# Patient Record
Sex: FEMALE | Race: ASIAN | Employment: OTHER | URBAN - METROPOLITAN AREA
[De-identification: names, ages, dates, MRNs, and addresses within clinical notes are randomized per-mention and may not be internally consistent; named-entity substitution may affect disease eponyms.]

---

## 2017-01-30 DIAGNOSIS — R73.01 IMPAIRED FASTING GLUCOSE: ICD-10-CM

## 2017-01-30 DIAGNOSIS — M81.0 OSTEOPOROSIS: Primary | ICD-10-CM

## 2017-02-01 RX ORDER — CALCITONIN SALMON 200 [IU]/.09ML
SPRAY, METERED NASAL
Qty: 3.7 ML | Refills: 1 | Status: SHIPPED | OUTPATIENT
Start: 2017-02-01 | End: 2017-04-10

## 2017-03-01 ENCOUNTER — TELEPHONE (OUTPATIENT)
Dept: FAMILY MEDICINE | Facility: OTHER | Age: 67
End: 2017-03-01

## 2017-03-01 DIAGNOSIS — E03.9 HYPOTHYROIDISM: ICD-10-CM

## 2017-03-01 DIAGNOSIS — E11.9 TYPE 2 DIABETES MELLITUS WITHOUT COMPLICATION (H): ICD-10-CM

## 2017-03-02 RX ORDER — LEVOTHYROXINE SODIUM 75 UG/1
TABLET ORAL
Qty: 30 TABLET | Refills: 0 | Status: SHIPPED | OUTPATIENT
Start: 2017-03-02 | End: 2017-04-10

## 2017-03-02 RX ORDER — ASPIRIN 81 MG
TABLET, DELAYED RELEASE (ENTERIC COATED) ORAL
Qty: 120 TABLET | Refills: 1 | OUTPATIENT
Start: 2017-03-02

## 2017-03-02 NOTE — TELEPHONE ENCOUNTER
Metformin 500 mg         Last Written Prescription Date: 12/2/2016  Last Fill Quantity: 180, # refills: 0  Last Office Visit with [facility] [place], [facility] or  Health prescribing provider:  09/13/2016        BP Readings from Last 3 Encounters:   09/13/16 120/80   07/26/16 124/66   06/22/16 109/87     Lab Results   Component Value Date    MICROL <5 09/13/2016     No results found for: MICROALBUMIN  Creatinine   Date Value Ref Range Status   08/23/2016 0.66 0.52 - 1.04 mg/dL Final   ]  GFR Estimate   Date Value Ref Range Status   08/23/2016 >90  Non  GFR Calc   >60 mL/min/1.7m2 Final   02/09/2016 88 >60 mL/min/1.7m2 Final     Comment:     Non  GFR Calc   09/09/2014 88 >60 mL/min/1.7m2 Final     Comment:     Non  GFR Calc     GFR Estimate If Black   Date Value Ref Range Status   08/23/2016 >90   GFR Calc   >60 mL/min/1.7m2 Final   02/09/2016 >90   GFR Calc   >60 mL/min/1.7m2 Final   09/09/2014 >90   GFR Calc   >60 mL/min/1.7m2 Final     Lab Results   Component Value Date    CHOL 152 02/09/2016     Lab Results   Component Value Date    HDL 61 02/09/2016     Lab Results   Component Value Date    LDL 76 02/09/2016     Lab Results   Component Value Date    TRIG 73 02/09/2016     Lab Results   Component Value Date    CHOLHDLRATIO 2.3 09/09/2014     Lab Results   Component Value Date    AST 21 08/23/2016     Lab Results   Component Value Date    ALT 31 08/23/2016     Lab Results   Component Value Date    A1C 6.2 08/23/2016    A1C 6.4 02/09/2016    A1C 6.4 05/07/2015    A1C 6.3 09/09/2014    A1C 6.0 09/25/2013     Potassium   Date Value Ref Range Status   08/23/2016 4.2 3.4 - 5.3 mmol/L Final       Levothyroxine 75 mcg     Last Written Prescription Date: 03/02/2016  Last Quantity: 90, # refills: 3  Last Office Visit with [facility] [place], [facility] or  Health prescribing provider: 09/13/2016        TSH   Date Value Ref Range Status   02/09/2016 3.79 0.40 - 4.00  mU/L Final

## 2017-03-02 NOTE — TELEPHONE ENCOUNTER
Metformin  Medication is being filled for 1 time refill only due to:  Patient needs labs A1c and Fasting lipids. Patient needs to be seen because due for diabetes follow up.  Please contact patient to schedule follow up with labs.   Nelida Marvin RN    Medication is being filled for 1 time refill only due to:  Patient needs labs TSH.  .   Nelida Marvin RN

## 2017-03-02 NOTE — TELEPHONE ENCOUNTER
Aspirin 81 mg      Last Written Prescription Date: 11/1/2016  Last Fill Quantity: 120,  # refills: 1   Last Office Visit with [facility], [facility] or [facility] prescribing provider: 09/13/2016

## 2017-03-03 NOTE — TELEPHONE ENCOUNTER
Called pt, was able to have her tell me that she speaks Mandarin Chinese. Called pt back with , pt did not answer. Will try again.

## 2017-03-03 NOTE — TELEPHONE ENCOUNTER
Refill not appropriate. Last sent 11/01/2016 #120, 1 refill. Refills remaining  Nelida Marvin RN

## 2017-03-07 ENCOUNTER — TELEPHONE (OUTPATIENT)
Dept: FAMILY MEDICINE | Facility: OTHER | Age: 67
End: 2017-03-07

## 2017-03-08 ENCOUNTER — TELEPHONE (OUTPATIENT)
Dept: PHARMACY | Facility: OTHER | Age: 67
End: 2017-03-08

## 2017-03-08 NOTE — TELEPHONE ENCOUNTER
MTM referral from: [facility]     MTM referral outreach attempt #1 on March 8, 2017 at 1:03 PM      Outcome: Patient is not interested at this time because she is in [facility] and will talk to Diamond when she get back in over a month (called with kingp), will route to MTM Pharmacist/Provider as an FYI. Thank you for the referral.     Delilah Vale, MTM Coordinator

## 2017-03-22 DIAGNOSIS — E11.9 TYPE 2 DIABETES MELLITUS WITHOUT COMPLICATION (H): ICD-10-CM

## 2017-03-22 DIAGNOSIS — E03.9 HYPOTHYROIDISM: ICD-10-CM

## 2017-03-22 LAB
CHOLEST SERPL-MCNC: 160 MG/DL
HBA1C MFR BLD: 6.3 % (ref 4.3–6)
HDLC SERPL-MCNC: 62 MG/DL
LDLC SERPL CALC-MCNC: 82 MG/DL
NONHDLC SERPL-MCNC: 98 MG/DL
TRIGL SERPL-MCNC: 82 MG/DL
TSH SERPL DL<=0.005 MIU/L-ACNC: 1.87 MU/L (ref 0.4–4)

## 2017-03-22 PROCEDURE — 80061 LIPID PANEL: CPT | Performed by: FAMILY MEDICINE

## 2017-03-22 PROCEDURE — 36415 COLL VENOUS BLD VENIPUNCTURE: CPT | Performed by: FAMILY MEDICINE

## 2017-03-22 PROCEDURE — 83036 HEMOGLOBIN GLYCOSYLATED A1C: CPT | Performed by: FAMILY MEDICINE

## 2017-03-22 PROCEDURE — 84443 ASSAY THYROID STIM HORMONE: CPT | Performed by: FAMILY MEDICINE

## 2017-04-06 NOTE — PROGRESS NOTES
SUBJECTIVE:                                                    Vero Scott is a 66 year old female who presents to clinic today for the following health issues:      HPI    Diabetes Follow-up    Patient is checking blood sugars: once daily.  Results are as follows:         am - 100s    Diabetic concerns: None and other - Blood Sugars after lunch are usually 140s     Symptoms of hypoglycemia (low blood sugar): none     Paresthesias (numbness or burning in feet) or sores: Yes Weakness on right leg     Date of last diabetic eye exam: July 2016     Hyperlipidemia Follow-Up      Rate your low fat/cholesterol diet?: not monitoring fat    Taking statin?  Yes (Lovastatin), no muscle aches from statin    Other lipid medications/supplements?:  none     Hypothyroidism Follow-up      Since last visit, patient describes the following symptoms: Weight stable, no hair loss, no skin changes, no constipation, no loose stools     Patient has a concern about her right eye.    Problem list and histories reviewed & adjusted, as indicated.  Additional history: as documented      Patient Active Problem List   Diagnosis     Esophageal reflux     Postmenopausal atrophic vaginitis     Nonspecific reaction to tuberculin skin test without active tuberculosis     Anxiety state     Other osteoporosis     Hypothyroidism     Adenomatous polyp of colon     Hyperlipidemia LDL goal <130     Advanced directives, counseling/discussion     Type 2 diabetes mellitus without complication (H)     Obstructive sleep apnea     Patellofemoral disorder of right knee     Rotator cuff syndrome     Pulmonary nodule     Obesity, Class II, BMI 35-39.9 (H)     Female stress incontinence     Plantar fasciitis     Bloating     Past Surgical History:   Procedure Laterality Date     C MAMMOGRAM, SCREENING  2003, 2002     C TOTAL ABDOM HYSTERECTOMY  2002    with BSO, for cervical dysplasia, no malignancy on biopsy     COLONOSCOPY  11/10/2010    COMBINED COLONOSCOPY,  REMOVE TUMOR/POLYP/LESION BY SNARE performed by JOHNATHON BHATT at  GI, recheck in 3 years     COLONOSCOPY N/A 4/10/2015    Procedure: COMBINED COLONOSCOPY, SINGLE OR MULTIPLE BIOPSY/POLYPECTOMY BY BIOPSY;  Surgeon: Johnathon Bhatt MD;  Location:  GI     COMBINED ESOPHAGOSCOPY, GASTROSCOPY, DUODENOSCOPY (EGD) WITH CO2 INSUFFLATION Bilateral 6/22/2016    Procedure: COMBINED ESOPHAGOSCOPY, GASTROSCOPY, DUODENOSCOPY (EGD) WITH CO2 INSUFFLATION;  Surgeon: Duane, William Charles, MD;  Location: [facility] OR     CONIZATION CERVIX,KNIFE/LASER  2002    CIS     ESOPHAGOSCOPY, GASTROSCOPY, DUODENOSCOPY (EGD), COMBINED N/A 6/22/2016    Procedure: COMBINED ESOPHAGOSCOPY, GASTROSCOPY, DUODENOSCOPY (EGD), BIOPSY SINGLE OR MULTIPLE;  Surgeon: Duane, William Charles, MD;  Location: [facility] OR     HC COLONOSCOPY THRU STOMA W BIOPSY/CAUTERY TUMOR/POLYP/LESION  1/10/2006    adenomatous, recheck in 5 years     HC UGI ENDOSCOPY DIAG W BIOPSY  11/22/05     HC UGI ENDOSCOPY, SIMPLE EXAM  12/03    abnormal     HCL PAP SMEAR  2002     HYSTERECTOMY, PAP NO LONGER INDICATED       SLING TRANSVAGINAL N/A 3/31/2016    Procedure: SLING TRANSVAGINAL;  Surgeon: González Wright MD;  Location: [facility] OR       Social History   Substance Use Topics     Smoking status: Never Smoker     Smokeless tobacco: Never Used      Comment: no smokers in household     Alcohol use Yes      Comment: occ     Family History   Problem Relation Age of Onset     CANCER No family hx of      breast or colon     DIABETES No family hx of      Hypertension No family hx of      HEART DISEASE No family hx of      CAD     Breast Cancer No family hx of      Depression No family hx of      Substance Abuse No family hx of      Anesthesia Reaction No family hx of      Genetic Disorder No family hx of          Current Outpatient Prescriptions   Medication Sig Dispense Refill     levothyroxine (SYNTHROID/LEVOTHROID) 75 MCG tablet Take 1 tablet (75 mcg) by mouth every morning 90 tablet 3      metFORMIN (GLUCOPHAGE) 500 MG tablet Take 1 tablet (500 mg) by mouth 2 times daily (with meals) 180 tablet 1     lovastatin (MEVACOR) 20 MG tablet Take 1 tablet (20 mg) by mouth At Bedtime 90 tablet 3     calcitonin, salmon, (MIACALCIN) 200 UNIT/ACT nasal spray SPRAY ONE SPRAY INTO ONE NOSTRIL DAILY.  (ALTERNATE NOSTRIL EACH DAY) 3.7 mL 1     blood glucose monitoring (ANGELA MICROLET) lancets TEST DAILY 1 Box 5     ANGELA CONTOUR test strip TEST DAILY AS DIRECTED 50 each 1     ANGELA CONTOUR test strip TEST DAILY AS DIRECTED 50 each 2     ASPIRIN LOW DOSE 81 MG EC tablet TAKE ONE TABLET BY MOUTH EVERY  tablet 1     Blood Pressure KIT Check blood pressures weekly 1 kit 0     blood glucose monitoring (NO BRAND SPECIFIED) meter device kit Use to test blood sugar one  times daily or as directed. 1 kit 0     blood glucose monitoring (NO BRAND SPECIFIED) test strip Use to test blood sugars one  times daily or as directed 90 each 3     blood glucose (NO BRAND SPECIFIED) lancets standard Use to test blood sugar one  times daily or as directed. 1 Box 0     omeprazole (PRILOSEC) 20 MG capsule Take 1 tablet daily for 2 weeks and if still having heartburn symptoms can increase to 1 tablet twice daily. 60 capsule 1     Cholecalciferol (VITAMIN D3 PO) Take 600 Units by mouth daily       blood glucose (ANGELA MICROLET 2) lancing device Use to test blood sugars 1 times daily or as directed. 1 each 0     [DISCONTINUED] metFORMIN (GLUCOPHAGE) 500 MG tablet TAKE ONE TABLET BY MOUTH TWICE A DAY WITH MEALS 60 tablet 0     [DISCONTINUED] levothyroxine (SYNTHROID/LEVOTHROID) 75 MCG tablet TAKE ONE TABLET BY MOUTH EVERY MORNING 30 tablet 0     [DISCONTINUED] lovastatin (MEVACOR) 20 MG tablet TAKE ONE TABLET BY MOUTH AT BEDTIME 90 tablet 0     ranitidine (ZANTAC) 150 MG tablet TAKE ONE TABLET BY MOUTH TWICE A  tablet 3     ORDER FOR DME Equipment being ordered: super feet size c       Allergies   Allergen Reactions     Sulfa Drugs  "     n&v     Recent Labs   Lab Test  03/22/17   1100  08/23/16   0943  02/09/16   1324   09/09/14   1128   01/03/13   1024   A1C  6.3*  6.2*  6.4*   < >  6.3*   < >  6.4*   LDL  82   --   76   --   77   < >  101   HDL  62   --   61   --   71   < >  59   TRIG  82   --   73   --   84   < >  67   ALT   --   31   --    --   27   --   26   CR   --   0.66  0.67   --   0.68   --    --    GFRESTIMATED   --   >90  Non  GFR Calc    88   --   88   --    --    GFRESTBLACK   --   >90   GFR Calc    >90   GFR Calc     --   >90   GFR Calc     --    --    POTASSIUM   --   4.2   --    --   3.8   --    --    TSH  1.87   --   3.79   < >  1.11   < >  0.98    < > = values in this interval not displayed.      BP Readings from Last 3 Encounters:   04/10/17 122/78   09/13/16 120/80   07/26/16 124/66    Wt Readings from Last 3 Encounters:   04/10/17 185 lb (83.9 kg)   09/13/16 185 lb (83.9 kg)   07/26/16 180 lb (81.6 kg)                  Labs reviewed in EPIC    ROS:  Constitutional, HEENT, cardiovascular, pulmonary, gi and gu systems are negative, except as otherwise noted.    OBJECTIVE:                                                    /78 (BP Location: Right arm, Patient Position: Chair, Cuff Size: Adult Large)  Pulse 70  Temp 97.5  F (36.4  C) (Oral)  Resp 16  Ht 5' 1.9\" (1.572 m)  Wt 185 lb (83.9 kg)  SpO2 96%  BMI 33.95 kg/m2  Body mass index is 33.95 kg/(m^2).  Physical Exam   Constitutional: She is oriented to person, place, and time. She appears well-developed and well-nourished.   HENT:   Head: Normocephalic and atraumatic.   Eyes: EOM are normal. Pupils are equal, round, and reactive to light.   Cardiovascular: Normal rate and regular rhythm.    Pulmonary/Chest: Effort normal and breath sounds normal.   Musculoskeletal: Normal range of motion.   Neurological: She is alert and oriented to person, place, and time.   Psychiatric: She has a normal mood and " "affect.         Diagnostic Test Results:  none      ASSESSMENT/PLAN:                                                      Problem List Items Addressed This Visit     Hypothyroidism     TSH well controlled  Continue current dose  Refill meds         Relevant Medications    levothyroxine (SYNTHROID/LEVOTHROID) 75 MCG tablet    Hyperlipidemia LDL goal <130     Continue statin         Relevant Medications    lovastatin (MEVACOR) 20 MG tablet    Type 2 diabetes mellitus without complication (H)     -Last a1c-6.3  -Home fasting Blood sugars- well controlled  -Last eye exam-July 2016  -Diabetic foot exam -Mild neuropathy.Callus formation Discussed foot care  -Last urine microalbumin- neg . 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)    -  Primary    Relevant Medications    levothyroxine (SYNTHROID/LEVOTHROID) 75 MCG tablet    metFORMIN (GLUCOPHAGE) 500 MG tablet    calcitonin, salmon, (MIACALCIN) 200 UNIT/ACT nasal spray    Other Relevant Orders    ORTHOTICS REFERRAL    Screening for diabetic retinopathy        Screening for diabetic peripheral neuropathy        Relevant Orders    FOOT EXAM  NO CHARGE [27522.114] (Completed)    Osteoporosis        Relevant Medications    calcitonin, salmon, (MIACALCIN) 200 UNIT/ACT nasal spray             BMI:   Estimated body mass index is 33.95 kg/(m^2) as calculated from the following:    Height as of this encounter: 5' 1.9\" (1.572 m).    Weight as of this encounter: 185 lb (83.9 kg).   Weight " management plan: Discussed healthy diet and exercise guidelines and patient will follow up in 6 months in clinic to re-evaluate.      See Patient Instructions    Marie Warner MD  [facility]

## 2017-04-10 ENCOUNTER — OFFICE VISIT (OUTPATIENT)
Dept: FAMILY MEDICINE | Facility: OTHER | Age: 67
End: 2017-04-10
Payer: COMMERCIAL

## 2017-04-10 VITALS
WEIGHT: 185 LBS | BODY MASS INDEX: 34.04 KG/M2 | DIASTOLIC BLOOD PRESSURE: 78 MMHG | OXYGEN SATURATION: 96 % | HEIGHT: 62 IN | RESPIRATION RATE: 16 BRPM | TEMPERATURE: 97.5 F | HEART RATE: 70 BPM | SYSTOLIC BLOOD PRESSURE: 122 MMHG

## 2017-04-10 DIAGNOSIS — E11.9 TYPE 2 DIABETES MELLITUS WITHOUT COMPLICATION, WITHOUT LONG-TERM CURRENT USE OF INSULIN (H): ICD-10-CM

## 2017-04-10 DIAGNOSIS — M81.0 OSTEOPOROSIS: ICD-10-CM

## 2017-04-10 DIAGNOSIS — Z13.5 SCREENING FOR DIABETIC RETINOPATHY: ICD-10-CM

## 2017-04-10 DIAGNOSIS — E11.49 OTHER DIABETIC NEUROLOGICAL COMPLICATION ASSOCIATED WITH TYPE 2 DIABETES MELLITUS (H): Primary | ICD-10-CM

## 2017-04-10 DIAGNOSIS — E78.5 HYPERLIPIDEMIA LDL GOAL <130: ICD-10-CM

## 2017-04-10 DIAGNOSIS — E03.9 HYPOTHYROIDISM, UNSPECIFIED TYPE: ICD-10-CM

## 2017-04-10 DIAGNOSIS — Z13.89 SCREENING FOR DIABETIC PERIPHERAL NEUROPATHY: ICD-10-CM

## 2017-04-10 PROCEDURE — 99214 OFFICE O/P EST MOD 30 MIN: CPT | Performed by: FAMILY MEDICINE

## 2017-04-10 PROCEDURE — 99207 C FOOT EXAM  NO CHARGE: CPT | Performed by: FAMILY MEDICINE

## 2017-04-10 RX ORDER — LOVASTATIN 20 MG
20 TABLET ORAL AT BEDTIME
Qty: 90 TABLET | Refills: 3 | Status: SHIPPED | OUTPATIENT
Start: 2017-04-10 | End: 2018-04-08

## 2017-04-10 RX ORDER — CALCITONIN SALMON 200 [IU]/.09ML
SPRAY, METERED NASAL
Qty: 3.7 ML | Refills: 1 | Status: SHIPPED | OUTPATIENT
Start: 2017-04-10 | End: 2017-06-01

## 2017-04-10 RX ORDER — LEVOTHYROXINE SODIUM 75 UG/1
75 TABLET ORAL EVERY MORNING
Qty: 90 TABLET | Refills: 3 | Status: SHIPPED | OUTPATIENT
Start: 2017-04-10 | End: 2018-04-08

## 2017-04-10 ASSESSMENT — PAIN SCALES - GENERAL: PAINLEVEL: NO PAIN (0)

## 2017-04-10 NOTE — MR AVS SNAPSHOT
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)    -  1    Screening for diabetic retinopathy        Screening for diabetic peripheral neuropathy        Hypothyroidism, unspecified type        Type 2 diabetes mellitus without complication, without long-term current use of insulin (H)        Hyperlipidemia LDL goal <130        Osteoporosis           Follow-ups after your visit        Additional Services     ORTHOTICS REFERRAL       **This referral order prints off in the [facility]  (Orthotics & Prosthetics) Central Scheduling Office**    The [facility] Central Scheduling Staff will contact the patient to schedule appointments.     Central Scheduling Contact Information: [phone] ([place])    Orthotics: Foot Orthotics    Please be aware that coverage of these services is subject to the terms and limitations of your health insurance plan.  Call member services at your health plan with any benefit or coverage questions.      Please bring the following to your appointment:    >>   Any x-rays, CTs or MRIs which have been performed.  Contact the facility where they were done to arrange for  prior to your scheduled appointment.    >>   List of current medications   >>   This referral request   >>   Any documents/labs given to you for this referral                  Follow-up notes from your care team     Return in about 6 months (around 10/10/2017).      Future tests that were ordered for you today     Open Future Orders        Priority Expected Expires Ordered    Comprehensive metabolic panel (BMP + Alb, Alk Phos, ALT, AST,  "Total. Bili, TP) Routine  4/10/2018 4/10/2017    Hemoglobin A1c Routine  4/10/2018 4/10/2017            Who to contact     If you have questions or need follow up information about today's clinic visit or your schedule please contact [facility] [place] directly at [phone].  Normal or non-critical lab and imaging results will be communicated to you by MyChart, letter or phone within 4 business days after the clinic has received the results. If you do not hear from us within 7 days, please contact the clinic through [facility] or phone. If you have a critical or abnormal lab result, we will notify you by phone as soon as possible.  Submit refill requests through [facility] or call your pharmacy and they will forward the refill request to us. Please allow 3 business days for your refill to be completed.          Additional Information About Your Visit        MyChart Information     [facility] gives you secure access to your electronic health record. If you see a primary care provider, you can also send messages to your care team and make appointments. If you have questions, please call your primary care clinic.  If you do not have a primary care provider, please call [phone] and they will assist you.        Care EveryWhere ID     This is your Care EveryWhere ID. This could be used by other organizations to access your [place] medical records  [id number]        Your Vitals Were     Pulse Temperature Respirations Height Pulse Oximetry BMI (Body Mass Index)    70 97.5  F (36.4  C) (Oral) 16 5' 1.9\" (1.572 m) 96% 33.95 kg/m2       Blood Pressure from Last 3 Encounters:   04/10/17 122/78   09/13/16 120/80   07/26/16 124/66    Weight from Last 3 Encounters:   04/10/17 185 lb (83.9 kg)   09/13/16 185 lb (83.9 kg)   07/26/16 180 lb (81.6 kg)              We Performed the Following     FOOT EXAM  NO CHARGE [00575.114]     ORTHOTICS REFERRAL          Today's Medication Changes          These changes are accurate as " of: 4/10/17 12:43 PM.  If you have any questions, ask your nurse or doctor.               These medicines have changed or have updated prescriptions.        Dose/Directions    calcitonin (salmon) 200 UNIT/ACT nasal spray   Commonly known as:  MIACALCIN   This may have changed:  See the new instructions.   Used for:  Osteoporosis   Changed by:  Marie Warner MD        SPRAY ONE SPRAY INTO ONE NOSTRIL DAILY.  (ALTERNATE NOSTRIL EACH DAY)   Quantity:  3.7 mL   Refills:  1       levothyroxine 75 MCG tablet   Commonly known as:  SYNTHROID/LEVOTHROID   This may have changed:  See the new instructions.   Used for:  Hypothyroidism, unspecified type   Changed by:  Marie Warner MD        Dose:  75 mcg   Take 1 tablet (75 mcg) by mouth every morning   Quantity:  90 tablet   Refills:  3       lovastatin 20 MG tablet   Commonly known as:  MEVACOR   This may have changed:  See the new instructions.   Used for:  Hyperlipidemia LDL goal <130   Changed by:  Marie Warner MD        Dose:  20 mg   Take 1 tablet (20 mg) by mouth At Bedtime   Quantity:  90 tablet   Refills:  3       metFORMIN 500 MG tablet   Commonly known as:  GLUCOPHAGE   This may have changed:  See the new instructions.   Used for:  Type 2 diabetes mellitus without complication, without long-term current use of insulin (H)   Changed by:  Marie Warner MD        Dose:  500 mg   Take 1 tablet (500 mg) by mouth 2 times daily (with meals)   Quantity:  180 tablet   Refills:  1         Stop taking these medicines if you haven't already. Please contact your care team if you have questions.     clotrimazole-betamethasone cream   Commonly known as:  LOTRISONE   Stopped by:  Marie Warner MD           HYDROcodone-acetaminophen 5-325 MG per tablet   Commonly known as:  NORCO   Stopped by:  Marie Warner MD           solifenacin 5 MG tablet   Commonly known as:  VESICARE   Stopped by:  Marie Warner MD           terbinafine 1 % cream   Commonly known  as:  lamISIL AT   Stopped by:  Marie Warner MD           tolterodine 2 MG tablet   Commonly known as:  DETROL   Stopped by:  Marie Warner MD                Where to get your medicines      These medications were sent to [facility] [place], [place] - [address] [facility]  [address] [facility] [address]     Phone:  [phone]     calcitonin (salmon) 200 UNIT/ACT nasal spray    levothyroxine 75 MCG tablet    lovastatin 20 MG tablet    metFORMIN 500 MG tablet                Primary Care Provider Office Phone # Fax #    Marie Warner [phone]       [facility] [address] [facility] [address]    [facility] [address]        Thank you!     Thank you for choosing [facility]  for your care. Our goal is always to provide you with excellent care. Hearing back from our patients is one way we can continue to improve our services. Please take a few minutes to complete the written survey that you may receive in the mail after your visit with us. Thank you!             Your Updated Medication List - Protect others around you: Learn how to safely use, store and throw away your medicines at www.disposemymeds.org.          This list is accurate as of: 4/10/17 12:43 PM.  Always use your most recent med list.                   Brand Name Dispense Instructions for use    ASPIRIN LOW DOSE 81 MG EC tablet   Generic drug:  aspirin     120 tablet    TAKE ONE TABLET BY MOUTH EVERY DAY       blood glucose lancets standard    no brand specified    1 Box    Use to test blood sugar one  times daily or as directed.       blood glucose lancing device     1 each    Use to test blood sugars 1 times daily or as directed.       blood glucose monitoring lancets     1 Box    TEST DAILY       blood glucose monitoring meter device kit    no brand specified    1 kit    Use to test blood sugar one  times daily or as directed.       * blood glucose monitoring test strip    no brand specified     90 each    Use to test blood sugars one  times daily or as directed       * ANGELA CONTOUR test strip   Generic drug:  blood glucose monitoring     50 each    TEST DAILY AS DIRECTED       * ANGELA CONTOUR test strip   Generic drug:  blood glucose monitoring     50 each    TEST DAILY AS DIRECTED       Blood Pressure Kit     1 kit    Check blood pressures weekly       calcitonin (salmon) 200 UNIT/ACT nasal spray    MIACALCIN    3.7 mL    SPRAY ONE SPRAY INTO ONE NOSTRIL DAILY.  (ALTERNATE NOSTRIL EACH DAY)       levothyroxine 75 MCG tablet    SYNTHROID/LEVOTHROID    90 tablet    Take 1 tablet (75 mcg) by mouth every morning       lovastatin 20 MG tablet    MEVACOR    90 tablet    Take 1 tablet (20 mg) by mouth At Bedtime       metFORMIN 500 MG tablet    GLUCOPHAGE    180 tablet    Take 1 tablet (500 mg) by mouth 2 times daily (with meals)       omeprazole 20 MG CR capsule    priLOSEC    60 capsule    Take 1 tablet daily for 2 weeks and if still having heartburn symptoms can increase to 1 tablet twice daily.       order for DME      Equipment being ordered: super feet size c       ranitidine 150 MG tablet    ZANTAC    180 tablet    TAKE ONE TABLET BY MOUTH TWICE A DAY       VITAMIN D3 PO      Take 600 Units by mouth daily       * Notice:  This list has 3 medication(s) that are the same as other medications prescribed for you. Read the directions carefully, and ask your doctor or other care provider to review them with you.

## 2017-04-10 NOTE — ASSESSMENT & PLAN NOTE
-Last a1c-6.3  -Home fasting Blood sugars- well controlled  -Last eye exam-July 2016  -Diabetic foot exam -Mild neuropathy.Callus formation Discussed foot care  -Last urine microalbumin- neg . On ACE  -Last LDL-on statin  -Continue current medications- metformin  -Refills provided  -Discussed diet , lifestyle modifications, exercise and weight loss  -RTC in 3-6 months for follow up

## 2017-04-10 NOTE — NURSING NOTE
"Chief Complaint   Patient presents with     Diabetes     Lipids     Panel Management     height, honoring choices, eye exam, foot exam, NIGHAT     Recheck Medication       Initial /78 (BP Location: Right arm, Patient Position: Chair, Cuff Size: Adult Large)  Pulse 70  Temp 97.5  F (36.4  C) (Oral)  Resp 16  Ht 5' 1.9\" (1.572 m)  Wt 185 lb (83.9 kg)  SpO2 96%  BMI 33.95 kg/m2 Estimated body mass index is 33.95 kg/(m^2) as calculated from the following:    Height as of this encounter: 5' 1.9\" (1.572 m).    Weight as of this encounter: 185 lb (83.9 kg).  Medication Reconciliation: complete   Peggy Mustafa CMA (AAMA)      "

## 2017-04-26 ENCOUNTER — TELEPHONE (OUTPATIENT)
Dept: FAMILY MEDICINE | Facility: OTHER | Age: 67
End: 2017-04-26

## 2017-04-26 NOTE — TELEPHONE ENCOUNTER
Reason for call:  Form  Reason for Call:  Form, our goal is to have forms completed with 72 hours, however, some forms may require a visit or additional information.    Type of letter, form or note:  medical    Who is the form from?: [employer] (if other please explain)    Where did the form come from: form was faxed in    What clinic location was the form placed at?: [facility] - [phone]    Where the form was placed: 's Box    What number is listed as a contact on the form?: [phone]       Additional comments: sign fax back    Call taken on 4/26/2017 at 11:49 AM by Kaylen Gibbs

## 2017-05-03 DIAGNOSIS — R73.01 IMPAIRED FASTING GLUCOSE: ICD-10-CM

## 2017-05-03 DIAGNOSIS — E78.5 HYPERLIPIDEMIA LDL GOAL <130: ICD-10-CM

## 2017-05-04 NOTE — TELEPHONE ENCOUNTER
Test strips      Last Written Prescription Date: 02/01/17  Last Fill Quantity: 50,  # refills: 1   Last Office Visit with [facility], [facility] or [facility] prescribing provider: 04/10/17                                             aspirin      Last Written Prescription Date: 11/01/16  Last Fill Quantity: 120,  # refills: 1   Last Office Visit with [facility], [facility] or [facility] prescribing provider: 04/10/17

## 2017-05-05 RX ORDER — ASPIRIN 81 MG
TABLET, DELAYED RELEASE (ENTERIC COATED) ORAL
Qty: 120 TABLET | Refills: 2 | Status: SHIPPED | OUTPATIENT
Start: 2017-05-05 | End: 2018-01-01

## 2017-05-05 NOTE — TELEPHONE ENCOUNTER
Prescription approved per [facility] Refill Protocol.  Elisa Kerr, RN, BSN

## 2017-06-01 DIAGNOSIS — R73.01 IMPAIRED FASTING GLUCOSE: ICD-10-CM

## 2017-06-01 DIAGNOSIS — M81.0 OSTEOPOROSIS: ICD-10-CM

## 2017-06-01 NOTE — TELEPHONE ENCOUNTER
Calcitron      Last Written Prescription Date:  4/10/2017  Last Fill Quantity: 3.7mL,   # refills: 1  Last Office Visit with [facility], [facility] or [facility] prescribing provider: 4/10/2017  Future Office visit:       Routing refill request to provider for review/approval because:  Drug not on the [facility], [facility] or [facility] refill protocol or controlled substance    Devon Contour Test Strip         Last Written Prescription Date: 12/2/2016  Last Fill Quantity: 50, # refills: 2  Last Office Visit with [facility], [facility] or [facility] prescribing provider:  4/10/2017        BP Readings from Last 3 Encounters:   04/10/17 122/78   09/13/16 120/80   07/26/16 124/66     Lab Results   Component Value Date    MICROL <5 09/13/2016     Lab Results   Component Value Date    UMALCR Unable to calculate due to low value 09/13/2016     Creatinine   Date Value Ref Range Status   08/23/2016 0.66 0.52 - 1.04 mg/dL Final   ]  GFR Estimate   Date Value Ref Range Status   08/23/2016 >90  Non  GFR Calc   >60 mL/min/1.7m2 Final   02/09/2016 88 >60 mL/min/1.7m2 Final     Comment:     Non  GFR Calc   09/09/2014 88 >60 mL/min/1.7m2 Final     Comment:     Non  GFR Calc     GFR Estimate If Black   Date Value Ref Range Status   08/23/2016 >90   GFR Calc   >60 mL/min/1.7m2 Final   02/09/2016 >90   GFR Calc   >60 mL/min/1.7m2 Final   09/09/2014 >90   GFR Calc   >60 mL/min/1.7m2 Final     Lab Results   Component Value Date    CHOL 160 03/22/2017     Lab Results   Component Value Date    HDL 62 03/22/2017     Lab Results   Component Value Date    LDL 82 03/22/2017     Lab Results   Component Value Date    TRIG 82 03/22/2017     Lab Results   Component Value Date    CHOLHDLRATIO 2.3 09/09/2014     Lab Results   Component Value Date    AST 21 08/23/2016     Lab Results   Component Value Date    ALT 31 08/23/2016     Lab Results   Component Value Date    A1C 6.3 03/22/2017    A1C 6.2  08/23/2016    A1C 6.4 02/09/2016    A1C 6.4 05/07/2015    A1C 6.3 09/09/2014     Potassium   Date Value Ref Range Status   08/23/2016 4.2 3.4 - 5.3 mmol/L Final       Aspirin 81mg      Last Written Prescription Date: 5/5/2017  Last Fill Quantity: 120,  # refills: 2   Last Office Visit with [facility], [facility] or [facility] prescribing provider: 4/10/2017

## 2017-06-05 RX ORDER — CALCITONIN SALMON 200 [IU]/.09ML
SPRAY, METERED NASAL
Qty: 3.7 ML | Refills: 9 | Status: SHIPPED | OUTPATIENT
Start: 2017-06-05 | End: 2018-04-08

## 2017-06-05 RX ORDER — ASPIRIN 81 MG
TABLET, DELAYED RELEASE (ENTERIC COATED) ORAL
Qty: 120 TABLET | Refills: 1 | OUTPATIENT
Start: 2017-06-05

## 2017-06-05 NOTE — TELEPHONE ENCOUNTER
Devon Contour Test Strips  Rx was sent 05/05/2017 for 50each tabs and 3 refills.   Pharmacy notified via E-prescribe refusal.  Beryl Ames RN, BSN     ASPIRIN LOW DOSE 81 MG EC tablet  Rx was sent 05/05/2017 for 120 tabs and 2 refills. Patient should have medication through 05/2018.  Pharmacy notified via E-prescribe refusal.  Beryl Ames RN, BSN     calcitonin, salmon, (MIACALCIN) 200 UNIT/ACT nasal spray    Prescription approved per [facility] Refill Protocol.  Beryl Ames RN, BSN       DEXA Scan:  Last order of DX HIP/PELVIS/SPINE was found on 9/12/2014 from Hospital Encounter on 9/12/2014     No order of DX HIP/PELVIS/SPINE W LAT FRACTION ANALYSIS is found.       Creatinine   Date Value Ref Range Status   08/23/2016 0.66 0.52 - 1.04 mg/dL Final

## 2017-07-06 NOTE — PROGRESS NOTES
SUBJECTIVE:                                                    Vero Scott is a 66 year old female who presents to clinic today for the following health issues:      HPI    Back Pain       Duration: 3 months        Specific cause: none    Description:   Location of pain: low back bilateral and left leg pain  Character of pain: dull ache and constant  Pain radiation:radiates into the left leg  New numbness or weakness in legs, not attributed to pain:  no     Intensity: mild    History:   Pain interferes with job: Not applicable  History of back problems: no prior back problems  Any previous MRI or X-rays: None  Sees a specialist for back pain:  No  Therapies tried without relief: None    Alleviating factors:   Improved by: None      Precipitating factors:  Worsened by: Standing    Functional and Psychosocial Screen (Poncho STarT Back):      Not performed today      Accompanying Signs & Symptoms:  Risk of Fracture:  None  Risk of Cauda Equina:  None  Risk of Infection:  None  Risk of Cancer:  None  Risk of Ankylosing Spondylitis:  Onset at age <35, male, AND morning back stiffness. no     Problem list and histories reviewed & adjusted, as indicated.  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 active tuberculosis     Anxiety state     Other osteoporosis     Hypothyroidism     Adenomatous polyp of colon     Hyperlipidemia LDL goal <130     Advanced directives, counseling/discussion     Type 2 diabetes mellitus without complication (H)     Obstructive sleep apnea     Patellofemoral disorder of right knee     Rotator cuff syndrome     Pulmonary nodule     Obesity, Class II, BMI 35-39.9     Female stress incontinence     Plantar fasciitis     Bloating     Past Surgical History:   Procedure Laterality Date     C MAMMOGRAM, SCREENING  2003, 2002     C TOTAL ABDOM  HYSTERECTOMY  2002    with BSO, for cervical dysplasia, no malignancy on biopsy     COLONOSCOPY  11/10/2010    COMBINED COLONOSCOPY, REMOVE TUMOR/POLYP/LESION BY SNARE performed by JOHNATHON BHATT at  GI, recheck in 3 years     COLONOSCOPY N/A 4/10/2015    Procedure: COMBINED COLONOSCOPY, SINGLE OR MULTIPLE BIOPSY/POLYPECTOMY BY BIOPSY;  Surgeon: Johnathon Bhatt MD;  Location:  GI     COMBINED ESOPHAGOSCOPY, GASTROSCOPY, DUODENOSCOPY (EGD) WITH CO2 INSUFFLATION Bilateral 6/22/2016    Procedure: COMBINED ESOPHAGOSCOPY, GASTROSCOPY, DUODENOSCOPY (EGD) WITH CO2 INSUFFLATION;  Surgeon: Duane, William Charles, MD;  Location: [facility] OR     CONIZATION CERVIX,KNIFE/LASER  2002    CIS     ESOPHAGOSCOPY, GASTROSCOPY, DUODENOSCOPY (EGD), COMBINED N/A 6/22/2016    Procedure: COMBINED ESOPHAGOSCOPY, GASTROSCOPY, DUODENOSCOPY (EGD), BIOPSY SINGLE OR MULTIPLE;  Surgeon: Duane, William Charles, MD;  Location: [facility] OR     HC COLONOSCOPY THRU STOMA W BIOPSY/CAUTERY TUMOR/POLYP/LESION  1/10/2006    adenomatous, recheck in 5 years     HC UGI ENDOSCOPY DIAG W BIOPSY  11/22/05     HC UGI ENDOSCOPY, SIMPLE EXAM  12/03    abnormal     HCL PAP SMEAR  2002     HYSTERECTOMY, PAP NO LONGER INDICATED       SLING TRANSVAGINAL N/A 3/31/2016    Procedure: SLING TRANSVAGINAL;  Surgeon: González Wright MD;  Location:  OR       Social History   Substance Use Topics     Smoking status: Never Smoker     Smokeless tobacco: Never Used      Comment: no smokers in household     Alcohol use Yes      Comment: occ     Family History   Problem Relation Age of Onset     CANCER No family hx of      breast or colon     DIABETES No family hx of      Hypertension No family hx of      HEART DISEASE No family hx of      CAD     Breast Cancer No family hx of      Depression No family hx of      Substance Abuse No family hx of      Anesthesia Reaction No family hx of      Genetic Disorder No family hx of          Current Outpatient Prescriptions   Medication Sig  Dispense Refill     calcitonin, salmon, (MIACALCIN) 200 UNIT/ACT nasal spray SPRAY ONE SPRAY INTO ONE NOSTRIL DAILY (ALTERNATE NOSTRIL DAILY ) 3.7 mL 9     ANGELA CONTOUR test strip TEST DAILY AS DIRECTED 50 each 3     ASPIRIN LOW DOSE 81 MG EC tablet TAKE ONE TABLET BY MOUTH EVERY  tablet 2     levothyroxine (SYNTHROID/LEVOTHROID) 75 MCG tablet Take 1 tablet (75 mcg) by mouth every morning 90 tablet 3     metFORMIN (GLUCOPHAGE) 500 MG tablet Take 1 tablet (500 mg) by mouth 2 times daily (with meals) 180 tablet 1     lovastatin (MEVACOR) 20 MG tablet Take 1 tablet (20 mg) by mouth At Bedtime 90 tablet 3     blood glucose monitoring (ANGELA MICROLET) lancets TEST DAILY 1 Box 5     ANGELA CONTOUR test strip TEST DAILY AS DIRECTED 50 each 2     ASPIRIN LOW DOSE 81 MG EC tablet TAKE ONE TABLET BY MOUTH EVERY  tablet 1     ranitidine (ZANTAC) 150 MG tablet TAKE ONE TABLET BY MOUTH TWICE A  tablet 3     Blood Pressure KIT Check blood pressures weekly 1 kit 0     blood glucose monitoring (NO BRAND SPECIFIED) meter device kit Use to test blood sugar one  times daily or as directed. 1 kit 0     blood glucose monitoring (NO BRAND SPECIFIED) test strip Use to test blood sugars one  times daily or as directed 90 each 3     blood glucose (NO BRAND SPECIFIED) lancets standard Use to test blood sugar one  times daily or as directed. 1 Box 0     omeprazole (PRILOSEC) 20 MG capsule Take 1 tablet daily for 2 weeks and if still having heartburn symptoms can increase to 1 tablet twice daily. 60 capsule 1     Cholecalciferol (VITAMIN D3 PO) Take 600 Units by mouth daily       ORDER FOR DME Equipment being ordered: super feet size c       blood glucose (ANGELA MICROLET 2) lancing device Use to test blood sugars 1 times daily or as directed. 1 each 0     Allergies   Allergen Reactions     Sulfa Drugs      n&v     BP Readings from Last 3 Encounters:   07/10/17 116/81   04/10/17 122/78   09/13/16 120/80    Wt Readings from  "Last 3 Encounters:   07/10/17 182 lb (82.6 kg)   04/10/17 185 lb (83.9 kg)   09/13/16 185 lb (83.9 kg)                  Labs reviewed in EPIC        ROS:  Constitutional, HEENT, cardiovascular, pulmonary, GI, , musculoskeletal, neuro, skin, endocrine and psych systems are negative, except as otherwise noted.      OBJECTIVE:   /81 (BP Location: Left arm, Patient Position: Chair, Cuff Size: Adult Regular)  Pulse 92  Temp 98.2  F (36.8  C) (Oral)  Resp 16  Ht 5' 1.9\" (1.572 m)  Wt 182 lb (82.6 kg)  SpO2 96%  BMI 33.4 kg/m2  Body mass index is 33.4 kg/(m^2).   Physical Exam   Constitutional: She is oriented to person, place, and time. She appears well-developed and well-nourished.   HENT:   Head: Normocephalic and atraumatic.   Neurological: She is alert and oriented to person, place, and time.   Psychiatric: She has a normal mood and affect.         Diagnostic Test Results:  none     ASSESSMENT/PLAN:     Problem List Items Addressed This Visit     None      Visit Diagnoses     Chronic bilateral back pain, unspecified back location    -  Primary    Relevant Orders    XR Lumbar Spine 2/3 Views (Completed)    XR Sacrum and Coccyx 2 Views (Completed)         Symptoms consistent with low back strain  Denies red flag signs - saddle numbness, urinary incontinence or stool incontinence.   Exam shows-negative for radiculopathy and myelopathy  eval  Treatment:muscle relaxants, NSAIDS and low back stretches      Marie Warner MD  [facility]"

## 2017-07-10 ENCOUNTER — OFFICE VISIT (OUTPATIENT)
Dept: FAMILY MEDICINE | Facility: OTHER | Age: 67
End: 2017-07-10
Payer: COMMERCIAL

## 2017-07-10 ENCOUNTER — RADIANT APPOINTMENT (OUTPATIENT)
Dept: GENERAL RADIOLOGY | Facility: OTHER | Age: 67
End: 2017-07-10
Attending: FAMILY MEDICINE
Payer: COMMERCIAL

## 2017-07-10 VITALS
OXYGEN SATURATION: 96 % | SYSTOLIC BLOOD PRESSURE: 116 MMHG | WEIGHT: 182 LBS | HEIGHT: 62 IN | HEART RATE: 92 BPM | RESPIRATION RATE: 16 BRPM | BODY MASS INDEX: 33.49 KG/M2 | DIASTOLIC BLOOD PRESSURE: 81 MMHG | TEMPERATURE: 98.2 F

## 2017-07-10 DIAGNOSIS — M54.9 CHRONIC BILATERAL BACK PAIN, UNSPECIFIED BACK LOCATION: Primary | ICD-10-CM

## 2017-07-10 DIAGNOSIS — M54.9 CHRONIC BILATERAL BACK PAIN, UNSPECIFIED BACK LOCATION: ICD-10-CM

## 2017-07-10 DIAGNOSIS — G89.29 CHRONIC BILATERAL BACK PAIN, UNSPECIFIED BACK LOCATION: Primary | ICD-10-CM

## 2017-07-10 DIAGNOSIS — G89.29 CHRONIC BILATERAL BACK PAIN, UNSPECIFIED BACK LOCATION: ICD-10-CM

## 2017-07-10 PROCEDURE — 72100 X-RAY EXAM L-S SPINE 2/3 VWS: CPT

## 2017-07-10 PROCEDURE — 99213 OFFICE O/P EST LOW 20 MIN: CPT | Performed by: FAMILY MEDICINE

## 2017-07-10 PROCEDURE — 72220 X-RAY EXAM SACRUM TAILBONE: CPT

## 2017-07-10 ASSESSMENT — PAIN SCALES - GENERAL: PAINLEVEL: NO PAIN (0)

## 2017-07-10 NOTE — NURSING NOTE
"Chief Complaint   Patient presents with     Back Pain     Musculoskeletal Problem     Panel Management     honoring choices, eye exam, NIGHAT       Initial /81 (BP Location: Left arm, Patient Position: Chair, Cuff Size: Adult Regular)  Pulse 92  Temp 98.2  F (36.8  C) (Oral)  Resp 16  Ht 5' 1.9\" (1.572 m)  Wt 182 lb (82.6 kg)  SpO2 96%  BMI 33.4 kg/m2 Estimated body mass index is 33.4 kg/(m^2) as calculated from the following:    Height as of this encounter: 5' 1.9\" (1.572 m).    Weight as of this encounter: 182 lb (82.6 kg).  Medication Reconciliation: complete   Peggy Mustafa CMA (AAMA)      "

## 2017-08-03 ENCOUNTER — TRANSFERRED RECORDS (OUTPATIENT)
Dept: HEALTH INFORMATION MANAGEMENT | Facility: CLINIC | Age: 67
End: 2017-08-03

## 2017-08-31 DIAGNOSIS — M81.0 OSTEOPOROSIS: ICD-10-CM

## 2017-08-31 DIAGNOSIS — R73.01 IMPAIRED FASTING GLUCOSE: ICD-10-CM

## 2017-08-31 NOTE — TELEPHONE ENCOUNTER
ANGELA CONTOUR test strip      Last Written Prescription Date: 5/5/17  Last Fill Quantity: 50 each,  # refills: 3   Last Office Visit with [facility], [facility] or  Health prescribing provider: 7/10/17      blood glucose monitoring (ANGELA MICROLET) lancets      Last Written Prescription Date: 2/1/17  Last Fill Quantity: 1 box,  # refills: 5   Last Office Visit with [facility], [facility] or [facility] prescribing provider: 7/10/17

## 2017-09-05 NOTE — PROGRESS NOTES
SUBJECTIVE:                                                    Vero Scott is a 67 year old female who presents to clinic today for the following health issues:      Pain   This is a new (Lower right quaderant pain ) problem. The current episode started more than 1 month ago (2-3 months ago ). The problem occurs intermittently (discomfort more than pain. ). The problem has been unchanged. Associated symptoms include abdominal pain and urinary symptoms. Pertinent negatives include no change in bowel habit, nausea or vomiting. Associated symptoms comments: Pt states that she has bowel movements 3-4 times per day. She feels her urinary frequency went up. Notices when she goes to lift a bucket or after work is when the discomfort is the worse. Pt states that she feels like there is a pressure there. . The symptoms are aggravated by twisting, walking and bending (lifting). She has tried lying down (streching) for the symptoms. The treatment provided significant relief.       -------------------------------------    Problem list and histories reviewed & adjusted, as indicated.  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 active tuberculosis     Anxiety state     Other osteoporosis     Hypothyroidism     Adenomatous polyp of colon     Hyperlipidemia LDL goal <130     Advanced directives, counseling/discussion     Type 2 diabetes mellitus without complication (H)     Obstructive sleep apnea     Patellofemoral disorder of right knee     Rotator cuff syndrome     Pulmonary nodule     Obesity, Class II, BMI 35-39.9     Female stress incontinence     Plantar fasciitis     Bloating     Ventral hernia without obstruction or gangrene     Past Surgical History:   Procedure Laterality Date     C MAMMOGRAM, SCREENING  2003, 2002     C TOTAL ABDOM HYSTERECTOMY  2002    with BSO,  for cervical dysplasia, no malignancy on biopsy     COLONOSCOPY  11/10/2010    COMBINED COLONOSCOPY, REMOVE TUMOR/POLYP/LESION BY SNARE performed by MORALES BHATT at  GI, recheck in 3 years     COLONOSCOPY N/A 4/10/2015    Procedure: COMBINED COLONOSCOPY, SINGLE OR MULTIPLE BIOPSY/POLYPECTOMY BY BIOPSY;  Surgeon: Morales Bhatt MD;  Location:  GI     COMBINED ESOPHAGOSCOPY, GASTROSCOPY, DUODENOSCOPY (EGD) WITH CO2 INSUFFLATION Bilateral 6/22/2016    Procedure: COMBINED ESOPHAGOSCOPY, GASTROSCOPY, DUODENOSCOPY (EGD) WITH CO2 INSUFFLATION;  Surgeon: Duane, William Charles, MD;  Location: [facility] OR     CONIZATION CERVIX,KNIFE/LASER  2002    CIS     ESOPHAGOSCOPY, GASTROSCOPY, DUODENOSCOPY (EGD), COMBINED N/A 6/22/2016    Procedure: COMBINED ESOPHAGOSCOPY, GASTROSCOPY, DUODENOSCOPY (EGD), BIOPSY SINGLE OR MULTIPLE;  Surgeon: Duane, William Charles, MD;  Location: [facility] OR     HC COLONOSCOPY THRU STOMA W BIOPSY/CAUTERY TUMOR/POLYP/LESION  1/10/2006    adenomatous, recheck in 5 years     HC UGI ENDOSCOPY DIAG W BIOPSY  11/22/05     HC UGI ENDOSCOPY, SIMPLE EXAM  12/03    abnormal     HCL PAP SMEAR  2002     HYSTERECTOMY, PAP NO LONGER INDICATED       SLING TRANSVAGINAL N/A 3/31/2016    Procedure: SLING TRANSVAGINAL;  Surgeon: González Wright MD;  Location: [facility] OR       Social History   Substance Use Topics     Smoking status: Never Smoker     Smokeless tobacco: Never Used      Comment: no smokers in household     Alcohol use Yes      Comment: occ     Family History   Problem Relation Age of Onset     CANCER No family hx of      breast or colon     DIABETES No family hx of      Hypertension No family hx of      HEART DISEASE No family hx of      CAD     Breast Cancer No family hx of      Depression No family hx of      Substance Abuse No family hx of      Anesthesia Reaction No family hx of      Genetic Disorder No family hx of          Current Outpatient Prescriptions   Medication Sig Dispense Refill     ANGELA CONTOUR  test strip TEST DAILY AS DIRECTED 50 each 3     blood glucose monitoring (ANGELA MICROLET) lancets TEST DAILY 100 each 5     calcitonin, salmon, (MIACALCIN) 200 UNIT/ACT nasal spray SPRAY ONE SPRAY INTO ONE NOSTRIL DAILY (ALTERNATE NOSTRIL DAILY ) 3.7 mL 9     ASPIRIN LOW DOSE 81 MG EC tablet TAKE ONE TABLET BY MOUTH EVERY  tablet 2     levothyroxine (SYNTHROID/LEVOTHROID) 75 MCG tablet Take 1 tablet (75 mcg) by mouth every morning 90 tablet 3     metFORMIN (GLUCOPHAGE) 500 MG tablet Take 1 tablet (500 mg) by mouth 2 times daily (with meals) 180 tablet 1     lovastatin (MEVACOR) 20 MG tablet Take 1 tablet (20 mg) by mouth At Bedtime 90 tablet 3     ANGELA CONTOUR test strip TEST DAILY AS DIRECTED 50 each 2     ASPIRIN LOW DOSE 81 MG EC tablet TAKE ONE TABLET BY MOUTH EVERY  tablet 1     ranitidine (ZANTAC) 150 MG tablet TAKE ONE TABLET BY MOUTH TWICE A  tablet 3     Blood Pressure KIT Check blood pressures weekly 1 kit 0     blood glucose monitoring (NO BRAND SPECIFIED) meter device kit Use to test blood sugar one  times daily or as directed. 1 kit 0     blood glucose monitoring (NO BRAND SPECIFIED) test strip Use to test blood sugars one  times daily or as directed 90 each 3     blood glucose (NO BRAND SPECIFIED) lancets standard Use to test blood sugar one  times daily or as directed. 1 Box 0     omeprazole (PRILOSEC) 20 MG capsule Take 1 tablet daily for 2 weeks and if still having heartburn symptoms can increase to 1 tablet twice daily. 60 capsule 1     Cholecalciferol (VITAMIN D3 PO) Take 600 Units by mouth daily       ORDER FOR DME Equipment being ordered: super feet size c       blood glucose (ANGELA MICROLET 2) lancing device Use to test blood sugars 1 times daily or as directed. 1 each 0     Allergies   Allergen Reactions     Sulfa Drugs      n&v     Recent Labs   Lab Test  09/07/17   1220  03/22/17   1100  08/23/16   0943  02/09/16   1324   09/09/14   1128   A1C  6.0  6.3*  6.2*  6.4*   <  ">  6.3*   LDL   --   82   --   76   --   77   HDL   --   62   --   61   --   71   TRIG   --   82   --   73   --   84   ALT  28   --   31   --    --   27   CR  0.70   --   0.66  0.67   --   0.68   GFRESTIMATED  84   --   >90  Non  GFR Calc    88   --   88   GFRESTBLACK  >90   --   >90   GFR Calc    >90   GFR Calc     --   >90   GFR Calc     POTASSIUM  3.9   --   4.2   --    --   3.8   TSH   --   1.87   --   3.79   < >  1.11    < > = values in this interval not displayed.      BP Readings from Last 3 Encounters:   09/07/17 120/80   07/10/17 116/81   04/10/17 122/78    Wt Readings from Last 3 Encounters:   09/07/17 184 lb (83.5 kg)   07/10/17 182 lb (82.6 kg)   04/10/17 185 lb (83.9 kg)                  Labs reviewed in EPIC          ROS:  Constitutional, HEENT, cardiovascular, pulmonary, GI, , musculoskeletal, neuro, skin, endocrine and psych systems are negative, except as otherwise noted.      OBJECTIVE:   /80 (BP Location: Right arm, Patient Position: Sitting, Cuff Size: Adult Regular)  Pulse 69  Temp 97.6  F (36.4  C)  Ht 5' 1.9\" (1.572 m)  Wt 184 lb (83.5 kg)  SpO2 95%  BMI 33.76 kg/m2  Body mass index is 33.76 kg/(m^2).   Physical Exam   Constitutional: She appears well-developed and well-nourished.   HENT:   Head: Normocephalic and atraumatic.   Eyes: EOM are normal.   Neck: Neck supple.   Cardiovascular: Normal rate, regular rhythm and normal heart sounds.  Exam reveals no gallop.    No murmur heard.  Pulmonary/Chest: Effort normal and breath sounds normal. No respiratory distress. She has no wheezes. She has no rales. She exhibits no tenderness.   Abdominal: Soft. Bowel sounds are normal.   Mild tenderness to palpation along the lower abdominal wall on the right side   Psychiatric: She has a normal mood and affect.         Diagnostic Test Results:  none     ASSESSMENT/PLAN:     Problem List Items Addressed This Visit     Type 2 " diabetes mellitus without complication (H)     Stable Home blood glucose levels.  Advised to continue current management.  Labs as ordered  For follow-up         Ventral hernia without obstruction or gangrene     Based on exam and patient's symptoms and presentation is consistent with having a possible femoral hernia No signs of obstruction. Discussed conservative management including limiting any bearing down, lifting weights. Also advised weight loss. Discussed home care  Reportable signs and symptoms discussed  RTC if symptoms persist or fail to improve             Other Visit Diagnoses     Type 2 diabetes mellitus with other specified complication, without long-term current use of insulin (H)    -  Primary    Relevant Orders    COMPREHENSIVE METABOLIC PANEL (Completed)    HEMOGLOBIN A1C (Completed)    Albumin Random Urine Quantitative with Creat Ratio (Completed)           Marie Warner MD  [facility]

## 2017-09-05 NOTE — TELEPHONE ENCOUNTER
blood glucose monitoring (ANGELA MICROLET) lancets     Prescription approved per [facility] Refill Protocol.      ANGELA CONTOUR test strip  Prescription approved per [facility] Refill Protocol.    Beryl Ames, RN, BSN

## 2017-09-07 ENCOUNTER — OFFICE VISIT (OUTPATIENT)
Dept: FAMILY MEDICINE | Facility: OTHER | Age: 67
End: 2017-09-07
Payer: MEDICARE

## 2017-09-07 VITALS
WEIGHT: 184 LBS | BODY MASS INDEX: 33.86 KG/M2 | DIASTOLIC BLOOD PRESSURE: 80 MMHG | SYSTOLIC BLOOD PRESSURE: 120 MMHG | TEMPERATURE: 97.6 F | HEART RATE: 69 BPM | HEIGHT: 62 IN | OXYGEN SATURATION: 95 %

## 2017-09-07 DIAGNOSIS — E11.69 TYPE 2 DIABETES MELLITUS WITH OTHER SPECIFIED COMPLICATION, WITHOUT LONG-TERM CURRENT USE OF INSULIN (H): Primary | ICD-10-CM

## 2017-09-07 DIAGNOSIS — E11.9 TYPE 2 DIABETES MELLITUS WITHOUT COMPLICATION, WITHOUT LONG-TERM CURRENT USE OF INSULIN (H): ICD-10-CM

## 2017-09-07 DIAGNOSIS — K43.9 VENTRAL HERNIA WITHOUT OBSTRUCTION OR GANGRENE: ICD-10-CM

## 2017-09-07 LAB
ALBUMIN SERPL-MCNC: 3.8 G/DL (ref 3.4–5)
ALP SERPL-CCNC: 81 U/L (ref 40–150)
ALT SERPL W P-5'-P-CCNC: 28 U/L (ref 0–50)
ANION GAP SERPL CALCULATED.3IONS-SCNC: 7 MMOL/L (ref 3–14)
AST SERPL W P-5'-P-CCNC: 19 U/L (ref 0–45)
BILIRUB SERPL-MCNC: 0.7 MG/DL (ref 0.2–1.3)
BUN SERPL-MCNC: 15 MG/DL (ref 7–30)
CALCIUM SERPL-MCNC: 9 MG/DL (ref 8.5–10.1)
CHLORIDE SERPL-SCNC: 109 MMOL/L (ref 94–109)
CO2 SERPL-SCNC: 26 MMOL/L (ref 20–32)
CREAT SERPL-MCNC: 0.7 MG/DL (ref 0.52–1.04)
CREAT UR-MCNC: 44 MG/DL
GFR SERPL CREATININE-BSD FRML MDRD: 84 ML/MIN/1.7M2
GLUCOSE SERPL-MCNC: 147 MG/DL (ref 70–99)
HBA1C MFR BLD: 6 % (ref 4.3–6)
MICROALBUMIN UR-MCNC: <5 MG/L
MICROALBUMIN/CREAT UR: NORMAL MG/G CR (ref 0–25)
POTASSIUM SERPL-SCNC: 3.9 MMOL/L (ref 3.4–5.3)
PROT SERPL-MCNC: 7.5 G/DL (ref 6.8–8.8)
SODIUM SERPL-SCNC: 142 MMOL/L (ref 133–144)

## 2017-09-07 PROCEDURE — 82043 UR ALBUMIN QUANTITATIVE: CPT | Performed by: FAMILY MEDICINE

## 2017-09-07 PROCEDURE — 83036 HEMOGLOBIN GLYCOSYLATED A1C: CPT | Performed by: FAMILY MEDICINE

## 2017-09-07 PROCEDURE — 80053 COMPREHEN METABOLIC PANEL: CPT | Performed by: FAMILY MEDICINE

## 2017-09-07 PROCEDURE — 36415 COLL VENOUS BLD VENIPUNCTURE: CPT | Performed by: FAMILY MEDICINE

## 2017-09-07 PROCEDURE — 99214 OFFICE O/P EST MOD 30 MIN: CPT | Performed by: FAMILY MEDICINE

## 2017-09-07 PROCEDURE — 99207 ZZC NO BILLABLE SERVICE THIS VISIT: CPT | Mod: U3 | Performed by: FAMILY MEDICINE

## 2017-09-07 ASSESSMENT — PAIN SCALES - GENERAL: PAINLEVEL: NO PAIN (0)

## 2017-09-07 ASSESSMENT — ENCOUNTER SYMPTOMS
NAUSEA: 0
VOMITING: 0
CHANGE IN BOWEL HABIT: 0
ABDOMINAL PAIN: 1

## 2017-09-07 NOTE — MR AVS SNAPSHOT
After Visit Summary   9/7/2017    Vero Scott    MRN: [id number]           Patient Information     Date Of Birth          1950        Visit Information        Provider Department      9/7/2017 11:15 AM Matt Roy; Marie Warner MD [facility]        Today's Diagnoses     Type 2 diabetes mellitus with other specified complication, without long-term current use of insulin (H)    -  1    Screening for diabetic retinopathy        Need for prophylactic vaccination and inoculation against influenza        Need for prophylactic vaccination against Streptococcus pneumoniae (pneumococcus)          Care Instructions      How a Hernia Develops  Although a hernia bulge may appear suddenly, hernias often take years to develop. They grow larger as pressure inside the body presses the intestines or other tissues out through a weak area in the abdominal wall, often at the belly button or a site of previous surgery. With time, these tissues can bulge out beneath the skin.  Stages of hernia development    The wall weakens or tears. The abdominal lining bulges out through a weak area and begins to form a hernia sac. The sac may contain fat, intestine, or other tissues. At this point, the hernia may or may not cause a visible bulge.        The intestine pushes into the sac. As the intestine pushes further into the sac, it forms a visible bulge. The bulge may flatten when you lie down or push against it. This is called a reducible hernia and does not cause any immediate danger.             The intestine may become trapped. The sac containing the intestine may become trapped by muscle (incarcerated). If this happens, you won t be able to flatten the bulge. You may also have pain. Prompt treatment is needed.        The intestine may become strangulated. If the intestine is tightly trapped, it becomes strangulated. The strangulated area loses blood supply and may die. This can cause severe pain and  "block the intestine. Emergency surgery is needed.   Date Last Reviewed: 8/1/2016 2000-2017 [employer]. [address] [place] [address], [place] [address]. All rights reserved. This information is not intended as a substitute for professional medical care. Always follow your healthcare professional's instructions.                Follow-ups after your visit        Follow-up notes from your care team     Return in about 6 months (around 3/7/2018).      Who to contact     If you have questions or need follow up information about today's clinic visit or your schedule please contact [facility] directly at [phone].  Normal or non-critical lab and imaging results will be communicated to you by MyChart, letter or phone within 4 business days after the clinic has received the results. If you do not hear from us within 7 days, please contact the clinic through [employer] or phone. If you have a critical or abnormal lab result, we will notify you by phone as soon as possible.  Submit refill requests through [employer] or call your pharmacy and they will forward the refill request to us. Please allow 3 business days for your refill to be completed.          Additional Information About Your Visit        MyChart Information     [employer] gives you secure access to your electronic health record. If you see a primary care provider, you can also send messages to your care team and make appointments. If you have questions, please call your primary care clinic.  If you do not have a primary care provider, please call [phone] and they will assist you.        Care EveryWhere ID     This is your Care EveryWhere ID. This could be used by other organizations to access your [facility] medical records  [id number]        Your Vitals Were     Pulse Temperature Height Pulse Oximetry BMI (Body Mass Index)       69 97.6  F (36.4  C) 5' 1.9\" (1.572 m) 95% 33.76 kg/m2        Blood Pressure from Last 3 Encounters: "   09/07/17 120/80   07/10/17 116/81   04/10/17 122/78    Weight from Last 3 Encounters:   09/07/17 184 lb (83.5 kg)   07/10/17 182 lb (82.6 kg)   04/10/17 185 lb (83.9 kg)              We Performed the Following     Albumin Random Urine Quantitative with Creat Ratio     COMPREHENSIVE METABOLIC PANEL     HEMOGLOBIN A1C        Primary Care Provider Office Phone # Fax #    Marie Warner [phone]       [address] [facility] [address]  [facility] [address]        Equal Access to Services     : Hadii diana ku hadasho Sodemarcus, waaxda luqadaha, qaybta kaalmada hakan, hernandez wilcox . So [facility] [phone].    ATENCIÓN: Si habla español, tiene a berman disposición servicios gratuitos de asistencia lingüística. [facility] [phone].    We comply with applicable federal civil rights laws and [place] laws. We do not discriminate on the basis of race, color, national origin, age, disability sex, sexual orientation or gender identity.            Thank you!     Thank you for choosing [facility]  for your care. Our goal is always to provide you with excellent care. Hearing back from our patients is one way we can continue to improve our services. Please take a few minutes to complete the written survey that you may receive in the mail after your visit with us. Thank you!             Your Updated Medication List - Protect others around you: Learn how to safely use, store and throw away your medicines at www.disposemymeds.org.          This list is accurate as of: 9/7/17 12:19 PM.  Always use your most recent med list.                   Brand Name Dispense Instructions for use Diagnosis    * ASPIRIN LOW DOSE 81 MG EC tablet   Generic drug:  aspirin     120 tablet    TAKE ONE TABLET BY MOUTH EVERY DAY    Hyperlipidemia LDL goal <130       * ASPIRIN LOW DOSE 81 MG EC tablet   Generic drug:  aspirin     120 tablet    TAKE ONE TABLET BY MOUTH EVERY DAY    Hyperlipidemia  LDL goal <130       blood glucose lancets standard    no brand specified    1 Box    Use to test blood sugar one  times daily or as directed.    Type 2 diabetes mellitus without complication (H)       blood glucose lancing device     1 each    Use to test blood sugars 1 times daily or as directed.    Impaired fasting glucose       blood glucose monitoring lancets     100 each    TEST DAILY    Osteoporosis, Impaired fasting glucose       blood glucose monitoring meter device kit    no brand specified    1 kit    Use to test blood sugar one  times daily or as directed.    Type 2 diabetes mellitus without complication (H)       * blood glucose monitoring test strip    no brand specified    90 each    Use to test blood sugars one  times daily or as directed    Type 2 diabetes mellitus without complication (H)       * ANGELA CONTOUR test strip   Generic drug:  blood glucose monitoring     50 each    TEST DAILY AS DIRECTED    Impaired fasting glucose       * ANGELA CONTOUR test strip   Generic drug:  blood glucose monitoring     50 each    TEST DAILY AS DIRECTED    Impaired fasting glucose       Blood Pressure Kit     1 kit    Check blood pressures weekly    Benign essential hypertension       calcitonin (salmon) 200 UNIT/ACT nasal spray    MIACALCIN    3.7 mL    SPRAY ONE SPRAY INTO ONE NOSTRIL DAILY (ALTERNATE NOSTRIL DAILY )    Osteoporosis       levothyroxine 75 MCG tablet    SYNTHROID/LEVOTHROID    90 tablet    Take 1 tablet (75 mcg) by mouth every morning    Hypothyroidism, unspecified type       lovastatin 20 MG tablet    MEVACOR    90 tablet    Take 1 tablet (20 mg) by mouth At Bedtime    Hyperlipidemia LDL goal <130       metFORMIN 500 MG tablet    GLUCOPHAGE    180 tablet    Take 1 tablet (500 mg) by mouth 2 times daily (with meals)    Type 2 diabetes mellitus without complication, without long-term current use of insulin (H)       omeprazole 20 MG CR capsule    priLOSEC    60 capsule    Take 1 tablet daily for 2  weeks and if still having heartburn symptoms can increase to 1 tablet twice daily.    Gastroesophageal reflux disease, esophagitis presence not specified       order for DME      Equipment being ordered: super feet size c    Plantar fasciitis       ranitidine 150 MG tablet    ZANTAC    180 tablet    TAKE ONE TABLET BY MOUTH TWICE A DAY    Gastroesophageal reflux disease, esophagitis presence not specified       VITAMIN D3 PO      Take 600 Units by mouth daily        * Notice:  This list has 5 medication(s) that are the same as other medications prescribed for you. Read the directions carefully, and ask your doctor or other care provider to review them with you.

## 2017-09-07 NOTE — ASSESSMENT & PLAN NOTE
Stable Home blood glucose levels.  Advised to continue current management.  Labs as ordered  For follow-up

## 2017-09-07 NOTE — PATIENT INSTRUCTIONS
How a Hernia Develops  Although a hernia bulge may appear suddenly, hernias often take years to develop. They grow larger as pressure inside the body presses the intestines or other tissues out through a weak area in the abdominal wall, often at the belly button or a site of previous surgery. With time, these tissues can bulge out beneath the skin.  Stages of hernia development    The wall weakens or tears. The abdominal lining bulges out through a weak area and begins to form a hernia sac. The sac may contain fat, intestine, or other tissues. At this point, the hernia may or may not cause a visible bulge.        The intestine pushes into the sac. As the intestine pushes further into the sac, it forms a visible bulge. The bulge may flatten when you lie down or push against it. This is called a reducible hernia and does not cause any immediate danger.             The intestine may become trapped. The sac containing the intestine may become trapped by muscle (incarcerated). If this happens, you won t be able to flatten the bulge. You may also have pain. Prompt treatment is needed.        The intestine may become strangulated. If the intestine is tightly trapped, it becomes strangulated. The strangulated area loses blood supply and may die. This can cause severe pain and block the intestine. Emergency surgery is needed.   Date Last Reviewed: 8/1/2016 2000-2017 [employer]. [address] [place] [address], [place] [address]. All rights reserved. This information is not intended as a substitute for professional medical care. Always follow your healthcare professional's instructions.

## 2017-10-01 DIAGNOSIS — E11.9 TYPE 2 DIABETES MELLITUS WITHOUT COMPLICATION, WITHOUT LONG-TERM CURRENT USE OF INSULIN (H): ICD-10-CM

## 2017-10-01 DIAGNOSIS — K21.9 GASTROESOPHAGEAL REFLUX DISEASE, ESOPHAGITIS PRESENCE NOT SPECIFIED: ICD-10-CM

## 2017-10-01 DIAGNOSIS — R73.01 IMPAIRED FASTING GLUCOSE: ICD-10-CM

## 2017-10-01 DIAGNOSIS — M81.0 OSTEOPOROSIS: ICD-10-CM

## 2017-10-03 NOTE — TELEPHONE ENCOUNTER
Pending Prescriptions:                       Disp   Refills    blood glucose monitoring (ANGELA MICROLET)*100 ea*5            Sig: TEST DAILY    metFORMIN (GLUCOPHAGE) 500 MG tablet [Pha*180 ta*1            Sig: TAKE ONE TABLET BY MOUTH TWICE A DAY WITH MEALS    ranitidine (ZANTAC) 150 MG tablet [Pharma*180 ta*3            Sig: TAKE ONE TABLET BY MOUTH TWICE A DAY    ANGELA CONTOUR test strip [Pharmacy Med Na*50 each3            Sig: TEST DAILY AS DIRECTED             Last Written Prescription Date: 9/5/2017  Last Fill Quantity: 100, # refills: 5  Last Office Visit with [facility], [facility] or [facility] prescribing provider:  9/7/2017        BP Readings from Last 3 Encounters:   09/07/17 120/80   07/10/17 116/81   04/10/17 122/78     Lab Results   Component Value Date    MICROL <5 09/07/2017     Lab Results   Component Value Date    UMALCR Unable to calculate due to low value 09/07/2017     Creatinine   Date Value Ref Range Status   09/07/2017 0.70 0.52 - 1.04 mg/dL Final   ]  GFR Estimate   Date Value Ref Range Status   09/07/2017 84 >60 mL/min/1.7m2 Final     Comment:     Non  GFR Calc   08/23/2016 >90  Non  GFR Calc   >60 mL/min/1.7m2 Final   02/09/2016 88 >60 mL/min/1.7m2 Final     Comment:     Non  GFR Calc     GFR Estimate If Black   Date Value Ref Range Status   09/07/2017 >90 >60 mL/min/1.7m2 Final     Comment:      GFR Calc   08/23/2016 >90   GFR Calc   >60 mL/min/1.7m2 Final   02/09/2016 >90   GFR Calc   >60 mL/min/1.7m2 Final     Lab Results   Component Value Date    CHOL 160 03/22/2017     Lab Results   Component Value Date    HDL 62 03/22/2017     Lab Results   Component Value Date    LDL 82 03/22/2017     Lab Results   Component Value Date    TRIG 82 03/22/2017     Lab Results   Component Value Date    CHOLHDLRATIO 2.3 09/09/2014     Lab Results   Component Value Date    AST 19 09/07/2017     Lab Results   Component Value  Date    ALT 28 09/07/2017     Lab Results   Component Value Date    A1C 6.0 09/07/2017    A1C 6.3 03/22/2017    A1C 6.2 08/23/2016    A1C 6.4 02/09/2016    A1C 6.4 05/07/2015     Potassium   Date Value Ref Range Status   09/07/2017 3.9 3.4 - 5.3 mmol/L Final     Metformin         Last Written Prescription Date: 4/10/2017  Last Fill Quantity: 180, # refills: 1  Last Office Visit with [facility], [facility] or [facility] prescribing provider:  9/7/2017        BP Readings from Last 3 Encounters:   09/07/17 120/80   07/10/17 116/81   04/10/17 122/78     Lab Results   Component Value Date    MICROL <5 09/07/2017     Lab Results   Component Value Date    UMALCR Unable to calculate due to low value 09/07/2017     Creatinine   Date Value Ref Range Status   09/07/2017 0.70 0.52 - 1.04 mg/dL Final   ]  GFR Estimate   Date Value Ref Range Status   09/07/2017 84 >60 mL/min/1.7m2 Final     Comment:     Non  GFR Calc   08/23/2016 >90  Non  GFR Calc   >60 mL/min/1.7m2 Final   02/09/2016 88 >60 mL/min/1.7m2 Final     Comment:     Non  GFR Calc     GFR Estimate If Black   Date Value Ref Range Status   09/07/2017 >90 >60 mL/min/1.7m2 Final     Comment:      GFR Calc   08/23/2016 >90   GFR Calc   >60 mL/min/1.7m2 Final   02/09/2016 >90   GFR Calc   >60 mL/min/1.7m2 Final     Lab Results   Component Value Date    CHOL 160 03/22/2017     Lab Results   Component Value Date    HDL 62 03/22/2017     Lab Results   Component Value Date    LDL 82 03/22/2017     Lab Results   Component Value Date    TRIG 82 03/22/2017     Lab Results   Component Value Date    CHOLHDLRATIO 2.3 09/09/2014     Lab Results   Component Value Date    AST 19 09/07/2017     Lab Results   Component Value Date    ALT 28 09/07/2017     Lab Results   Component Value Date    A1C 6.0 09/07/2017    A1C 6.3 03/22/2017    A1C 6.2 08/23/2016    A1C 6.4 02/09/2016    A1C 6.4 05/07/2015     Potassium    Date Value Ref Range Status   09/07/2017 3.9 3.4 - 5.3 mmol/L Final     Devon Contour Test Strip         Last Written Prescription Date: 9/5/2017  Last Fill Quantity: 50, # refills: 3  Last Office Visit with [facility], P or [facility] prescribing provider:  9/7/2017        BP Readings from Last 3 Encounters:   09/07/17 120/80   07/10/17 116/81   04/10/17 122/78     Lab Results   Component Value Date    MICROL <5 09/07/2017     Lab Results   Component Value Date    UMALCR Unable to calculate due to low value 09/07/2017     Creatinine   Date Value Ref Range Status   09/07/2017 0.70 0.52 - 1.04 mg/dL Final   ]  GFR Estimate   Date Value Ref Range Status   09/07/2017 84 >60 mL/min/1.7m2 Final     Comment:     Non  GFR Calc   08/23/2016 >90  Non  GFR Calc   >60 mL/min/1.7m2 Final   02/09/2016 88 >60 mL/min/1.7m2 Final     Comment:     Non  GFR Calc     GFR Estimate If Black   Date Value Ref Range Status   09/07/2017 >90 >60 mL/min/1.7m2 Final     Comment:      GFR Calc   08/23/2016 >90   GFR Calc   >60 mL/min/1.7m2 Final   02/09/2016 >90   GFR Calc   >60 mL/min/1.7m2 Final     Lab Results   Component Value Date    CHOL 160 03/22/2017     Lab Results   Component Value Date    HDL 62 03/22/2017     Lab Results   Component Value Date    LDL 82 03/22/2017     Lab Results   Component Value Date    TRIG 82 03/22/2017     Lab Results   Component Value Date    CHOLHDLRATIO 2.3 09/09/2014     Lab Results   Component Value Date    AST 19 09/07/2017     Lab Results   Component Value Date    ALT 28 09/07/2017     Lab Results   Component Value Date    A1C 6.0 09/07/2017    A1C 6.3 03/22/2017    A1C 6.2 08/23/2016    A1C 6.4 02/09/2016    A1C 6.4 05/07/2015     Potassium   Date Value Ref Range Status   09/07/2017 3.9 3.4 - 5.3 mmol/L Final     Ranitidine       Last Written Prescription Date: 10/3/2016  Last Fill Quantity: 180,  # refills: 3    Last Office Visit with [facility], [facility] or [facility] prescribing provider: 9/7/2017

## 2017-10-03 NOTE — TELEPHONE ENCOUNTER
Should not need refills yet of test strips and lancets  Metformin approved  Ranitidine approved    Julio Vallejo, RN, BSN

## 2017-12-15 ENCOUNTER — OFFICE VISIT (OUTPATIENT)
Dept: URGENT CARE | Facility: RETAIL CLINIC | Age: 67
End: 2017-12-15
Payer: MEDICARE

## 2017-12-15 VITALS
TEMPERATURE: 97.7 F | DIASTOLIC BLOOD PRESSURE: 81 MMHG | SYSTOLIC BLOOD PRESSURE: 128 MMHG | HEART RATE: 70 BPM | OXYGEN SATURATION: 96 %

## 2017-12-15 DIAGNOSIS — J06.9 VIRAL URI WITH COUGH: Primary | ICD-10-CM

## 2017-12-15 PROCEDURE — 99203 OFFICE O/P NEW LOW 30 MIN: CPT | Performed by: PHYSICIAN ASSISTANT

## 2017-12-15 NOTE — MR AVS SNAPSHOT
After Visit Summary   12/15/2017    Vero Scott    MRN: [id number]           Patient Information     Date Of Birth          1950        Visit Information        Provider Department      12/15/2017 2:40 PM Kaylen Davalos PA-C [facility]        Today's Diagnoses     Viral URI with cough    -  1      Care Instructions    No antibiotic needed.  Rest!   Drink lots of water.  Delsym (dextromethorphan) is a 12 hour over the counter cough medication   Mucinex or Robitussin (guiafenesin) thin mucus and may help it to loosen more quickly  Honey may soothe your sore throat and help manage your cough- may take straight or add to tea  Take Tylenol or ibuprofen as needed for pain.  Sit in the bathroom with a hot shower running and breathe in the steam.  Follow up if cough gets a lot worse or doesn't get better in 14 days.          Follow-ups after your visit        Your next 10 appointments already scheduled     Dec 18, 2017 11:15 AM CST   [facility] C[facility]   [facility]    [address] [place] [id number] [phone]              Who to contact     You can reach your care team any time of the day by calling [phone].  Notification of test results:  If you have an abnormal lab result, we will notify you by phone as soon as possible.         Additional Information About Your Visit        MyChart Information     [employer] gives you secure access to your electronic health record. If you see a primary care provider, you can also send messages to your care team and make appointments. If you have questions, please call your primary care clinic.  If you do not have a primary care provider, please call [phone] and they will assist you.        Care EveryWhere ID     This is your Care EveryWhere ID. This could be used by other organizations to access your [facility] medical records  [id number]        Your  Vitals Were     Pulse Temperature Pulse Oximetry             70 97.7  F (36.5  C) (Oral) 96%          Blood Pressure from Last 3 Encounters:   12/15/17 128/81   09/07/17 120/80   07/10/17 116/81    Weight from Last 3 Encounters:   09/07/17 184 lb (83.5 kg)   07/10/17 182 lb (82.6 kg)   04/10/17 185 lb (83.9 kg)              Today, you had the following     No orders found for display         Today's Medication Changes          These changes are accurate as of: 12/15/17  3:20 PM.  If you have any questions, ask your nurse or doctor.               These medicines have changed or have updated prescriptions.        Dose/Directions    ASPIRIN LOW DOSE 81 MG EC tablet   This may have changed:  Another medication with the same name was removed. Continue taking this medication, and follow the directions you see here.   Used for:  Hyperlipidemia LDL goal <130   Generic drug:  aspirin        TAKE ONE TABLET BY MOUTH EVERY DAY   Quantity:  120 tablet   Refills:  2                Primary Care Provider Office Phone # Fax #    Marie Warner [phone]       [address] [place] [address]        Equal Access to Services     [facility]: Hadii diana hartmann Sodemarcus, waaxda lulorene, qaybta kaalmada hakan, hernandez wilcox . So [facility] [phone].    ATENCIÓN: Si habla español, tiene a berman disposición servicios gratuitos de asistencia lingüística. Llame al [phone].    We comply with applicable federal civil rights laws and [place] laws. We do not discriminate on the basis of race, color, national origin, age, disability, sex, sexual orientation, or gender identity.            Thank you!     Thank you for choosing [facility]  for your care. Our goal is always to provide you with excellent care. Hearing back from our patients is one way we can continue to improve our services. Please take a few minutes to complete the written survey that you may  receive in the mail after your visit with us. Thank you!             Your Updated Medication List - Protect others around you: Learn how to safely use, store and throw away your medicines at www.disposemymeds.org.          This list is accurate as of: 12/15/17  3:20 PM.  Always use your most recent med list.                   Brand Name Dispense Instructions for use Diagnosis    ASPIRIN LOW DOSE 81 MG EC tablet   Generic drug:  aspirin     120 tablet    TAKE ONE TABLET BY MOUTH EVERY DAY    Hyperlipidemia LDL goal <130       blood glucose lancets standard    no brand specified    1 Box    Use to test blood sugar one  times daily or as directed.    Type 2 diabetes mellitus without complication (H)       blood glucose lancing device     1 each    Use to test blood sugars 1 times daily or as directed.    Impaired fasting glucose       blood glucose monitoring lancets     100 each    TEST DAILY    Osteoporosis, Impaired fasting glucose       blood glucose monitoring meter device kit    no brand specified    1 kit    Use to test blood sugar one  times daily or as directed.    Type 2 diabetes mellitus without complication (H)       * blood glucose monitoring test strip    no brand specified    90 each    Use to test blood sugars one  times daily or as directed    Type 2 diabetes mellitus without complication (H)       * ANGELA CONTOUR test strip   Generic drug:  blood glucose monitoring     50 each    TEST DAILY AS DIRECTED    Impaired fasting glucose       * ANGELA CONTOUR test strip   Generic drug:  blood glucose monitoring     50 each    TEST DAILY AS DIRECTED    Impaired fasting glucose       Blood Pressure Kit     1 kit    Check blood pressures weekly    Benign essential hypertension       calcitonin (salmon) 200 UNIT/ACT nasal spray    MIACALCIN    3.7 mL    SPRAY ONE SPRAY INTO ONE NOSTRIL DAILY (ALTERNATE NOSTRIL DAILY )    Osteoporosis       levothyroxine 75 MCG tablet    SYNTHROID/LEVOTHROID    90 tablet    Take 1  tablet (75 mcg) by mouth every morning    Hypothyroidism, unspecified type       lovastatin 20 MG tablet    MEVACOR    90 tablet    Take 1 tablet (20 mg) by mouth At Bedtime    Hyperlipidemia LDL goal <130       metFORMIN 500 MG tablet    GLUCOPHAGE    180 tablet    TAKE ONE TABLET BY MOUTH TWICE A DAY WITH MEALS    Type 2 diabetes mellitus without complication, without long-term current use of insulin (H)       omeprazole 20 MG CR capsule    priLOSEC    60 capsule    Take 1 tablet daily for 2 weeks and if still having heartburn symptoms can increase to 1 tablet twice daily.    Gastroesophageal reflux disease, esophagitis presence not specified       order for DME      Equipment being ordered: super feet size c    Plantar fasciitis       ranitidine 150 MG tablet    ZANTAC    180 tablet    TAKE ONE TABLET BY MOUTH TWICE A DAY    Gastroesophageal reflux disease, esophagitis presence not specified       VITAMIN D3 PO      Take 600 Units by mouth daily        * Notice:  This list has 3 medication(s) that are the same as other medications prescribed for you. Read the directions carefully, and ask your doctor or other care provider to review them with you.

## 2017-12-15 NOTE — PROGRESS NOTES
Chief Complaint   Patient presents with     Nasal Congestion     Cough     began last night     Throat Pain     began last night; dryness     Headache     2 days     SUBJECTIVE:  Vero Scott is a 67 year old female who presents to the clinic today with her daughter, who acted as her , with a chief complaint of cough for 1 day.  Her cough is described as persistent.  The patient's symptoms are mild and moderate and not changing over the course of time.  Associated symptoms include uncomfortable dry throat.  The patient's symptoms are exacerbated by nothing. Cough does not keep her up at night.  Patient has been using nothing to improve symptoms.  Predisposing factors include: None.    Past Medical History:   Diagnosis Date     Abnormal mammogram, unspecified 2002    stable left outer breast nodule     Adenomatous polyp of colon 10/2010    recheck colonoscopy in 3 years     Anxiety state, unspecified 12/05     Cough     chronic, attributed to GERD     Dermatophytosis of foot 12/05     Diabetic eye exam (H) 09/25/2013    [facility]     Diabetic eye exam (H) 09/29/2014    [facility]     Dysplasia of cervix (uteri) 2002    HGSIL     Esophageal reflux     atypical chest pain, nocturnal cough     Helicobacter pylori (H. 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    possible 2 cm submucosal esophageal mass (vs extrinsic compression) just above GE junction     Postmenopausal atrophic vaginitis      Reflux esophagitis 12/03    chronic esophagitis on biopsy     Symptomatic states associated with  artificial menopause 2002     Thyroid disease      Tuberculin test reaction     positive PPD, neg CXR, no known h/o TB     Current Outpatient Prescriptions   Medication Sig Dispense Refill     metFORMIN (GLUCOPHAGE) 500 MG tablet TAKE ONE TABLET BY MOUTH TWICE A DAY WITH MEALS 180 tablet 1     ranitidine (ZANTAC) 150 MG tablet TAKE ONE TABLET BY MOUTH TWICE A  tablet 3     ANGELA CONTOUR test strip TEST DAILY AS DIRECTED 50 each 3     blood glucose monitoring (ANGELA MICROLET) lancets TEST DAILY 100 each 5     calcitonin, salmon, (MIACALCIN) 200 UNIT/ACT nasal spray SPRAY ONE SPRAY INTO ONE NOSTRIL DAILY (ALTERNATE NOSTRIL DAILY ) 3.7 mL 9     ASPIRIN LOW DOSE 81 MG EC tablet TAKE ONE TABLET BY MOUTH EVERY  tablet 2     levothyroxine (SYNTHROID/LEVOTHROID) 75 MCG tablet Take 1 tablet (75 mcg) by mouth every morning 90 tablet 3     lovastatin (MEVACOR) 20 MG tablet Take 1 tablet (20 mg) by mouth At Bedtime 90 tablet 3     ANGELA CONTOUR test strip TEST DAILY AS DIRECTED 50 each 2     Blood Pressure KIT Check blood pressures weekly 1 kit 0     blood glucose monitoring (NO BRAND SPECIFIED) meter device kit Use to test blood sugar one  times daily or as directed. 1 kit 0     blood glucose monitoring (NO BRAND SPECIFIED) test strip Use to test blood sugars one  times daily or as directed 90 each 3     blood glucose (NO BRAND SPECIFIED) lancets standard Use to test blood sugar one  times daily or as directed. 1 Box 0     omeprazole (PRILOSEC) 20 MG capsule Take 1 tablet daily for 2 weeks and if still having heartburn symptoms can increase to 1 tablet twice daily. 60 capsule 1     Cholecalciferol (VITAMIN D3 PO) Take 600 Units by mouth daily       ORDER FOR DME Equipment being ordered: super feet size c       blood glucose (ANGELA MICROLET 2) lancing device Use to test blood sugars 1 times daily or as directed. 1 each 0     Social History   Substance Use Topics     Smoking status: Never Smoker     Smokeless  tobacco: Never Used      Comment: no smokers in household     Alcohol use Yes      Comment: occ     Allergies   Allergen Reactions     Sulfa Drugs      n&v     ROS  Review of systems negative except as stated above.    OBJECTIVE:  /81 (BP Location: Left arm)  Pulse 70  Temp 97.7  F (36.5  C) (Oral)  SpO2 96%  GENERAL APPEARANCE: healthy, alert and in no distress  HEENT: PERRL, conjunctiva clear. Bilateral ear canals and TM's normal. Nose without erythematous or edematous turbinates. Posterior pharynx nonerythematous and without tonsillar hypertrophy or exudate.  NECK: supple, nontender, no lymphadenopathy  RESP: lungs clear to auscultation - no rales, rhonchi or wheezes. Breathing is comfortable, not labored and without use of accessory muscles.  CV: regular rates and rhythm, normal S1 S2, no murmur noted  ABDOMEN:  soft, nontender, no HSM or masses and bowel sounds normal    ASSESSMENT:    ICD-10-CM    1. Viral URI with cough J06.9     B97.89      PLAN:   Patient Instructions   No antibiotic needed.  Rest!   Drink lots of water.  Delsym (dextromethorphan) is a 12 hour over the counter cough medication   Mucinex or Robitussin (guiafenesin) thin mucus and may help it to loosen more quickly  Honey may soothe your sore throat and help manage your cough- may take straight or add to tea  Take Tylenol or ibuprofen as needed for pain.  Sit in the bathroom with a hot shower running and breathe in the steam.  Follow up if cough gets a lot worse or doesn't get better in 14 days.    Follow up with primary care provider with any problems, questions or concerns or if symptoms worsen or fail to improve. Patient agreed to plan and verbalized understanding.    Khushi Davalos PA-C  [facility]

## 2017-12-15 NOTE — PATIENT INSTRUCTIONS
No antibiotic needed.  Rest!   Drink lots of water.  Delsym (dextromethorphan) is a 12 hour over the counter cough medication   Mucinex or Robitussin (guiafenesin) thin mucus and may help it to loosen more quickly  Honey may soothe your sore throat and help manage your cough- may take straight or add to tea  Take Tylenol or ibuprofen as needed for pain.  Sit in the bathroom with a hot shower running and breathe in the steam.  Follow up if cough gets a lot worse or doesn't get better in 14 days.

## 2017-12-15 NOTE — NURSING NOTE
"Chief Complaint   Patient presents with     Nasal Congestion     Cough     began last night     Throat Pain     began last night; dryness     Headache     2 days       Initial /81 (BP Location: Left arm)  Pulse 70  Temp 97.7  F (36.5  C) (Oral)  SpO2 96% Estimated body mass index is 33.76 kg/(m^2) as calculated from the following:    Height as of 9/7/17: 5' 1.9\" (1.572 m).    Weight as of 9/7/17: 184 lb (83.5 kg).  Medication Reconciliation: complete  "

## 2018-01-01 DIAGNOSIS — E78.5 HYPERLIPIDEMIA LDL GOAL <130: ICD-10-CM

## 2018-01-03 NOTE — TELEPHONE ENCOUNTER
Requested Prescriptions   Pending Prescriptions Disp Refills     aspirin 81 MG EC tablet [Pharmacy Med Name: ASPIRIN 81MG TBEC] 120 tablet 2     Sig: TAKE ONE TABLET BY MOUTH EVERY DAY    Analgesics (Non-Narcotic Tylenol and ASA Only) Passed    1/1/2018  7:54 PM       Passed - Recent or future visit with authorizing provider's specialty    Patient had office visit in the last year or has a visit in the next 30 days with authorizing provider.  See chart review.              Passed - Patient is age 20 years or older    If ASA is flagged for ages under 20 years old. Forward to provider for confirmation Ryes Syndrome is not a concern.              ASPIRIN LOW DOSE 81 MG EC tablet  Prescription approved per [facility] Refill Protocol.    Beryl Ames, RN, BSN

## 2018-02-01 DIAGNOSIS — E78.5 HYPERLIPIDEMIA LDL GOAL <130: ICD-10-CM

## 2018-02-02 NOTE — TELEPHONE ENCOUNTER
aspirin 81 MG EC tablet  Rx was sent 01/03/2018 for 120 tabs and 3 refills. Patient should have medication through 08/2018.  Pharmacy notified via E-prescribe refusal.  Beryl Ames, RN, BSN

## 2018-02-12 NOTE — PROGRESS NOTES
"  SUBJECTIVE:   Vero Scott is a 67 year old female who presents to clinic today for the following health issues:      HPI     Concern - hot and cold flashes, sweating back  Onset: 1-2 years    Intensity: moderate    Progression of Symptoms:  Worsening, having to change shirt several times a day due to sweating     Accompanying Signs & Symptoms:  States her heart feels tired, she would like to have her heart checked     Previous history of similar problem:   Yes     Precipitating factors:   Worsened by: eating certain foods, getting nervous    Alleviating factors:  Improved by: Lying or sitting down for a while    Therapies Tried and outcome: None      Interview completed with the assistance of a Nunda Mandarin phone interpretor.    Patient states she has been experiencing \"feeling hot\" and sweaty every morning upon waking for the past year. It will also occur after eating and when exercising. She states that sweating is extensive over her chest and low back and she will often have to change her clothes multiple times per day. She states the sweating and feeling hot only lasts a few minutes. This will alternate between feeling cold throughout the day as well. She checks her blood sugar daily and it is usually in the low 100's. She does not check it during her hot/cold episodes. She states she had a hysterectomy 30 years ago and had hot flashes at that time, somewhat similar to what she is currently experiencing. She denies any significant fatigue weight changes, sweats waking her up at night, chest pain or shortness of breath. She wants to make sure her heart is not causing these symptoms.    She has been experiencing right hip pain for the past few years, somewhat worse recently, improved with a heating pad.     Problem list and histories reviewed & adjusted, as indicated.  Additional history: none    ROS:  GENERAL: Denies fever, fatigue, weakness, weight gain, or weight loss.  CARDIOVASCULAR: Denies chest " "pain, shortness of breath, irregular heartbeats, palpitations, or edema.  RESPIRATORY: Denies cough, hemoptysis, and shortness of breath.   MUSCULOSKELETAL: +Right hip pain.  NEUROLOGIC: Denies headache, fainting, dizziness, memory loss, numbness, tingling, or seizures.  PSYCHIATRIC: Denies depression, anxiety, mood swings, and thoughts of suicide.  ENDOCRINE: +Sweats and feeling hot alternating with feeling cold.     OBJECTIVE:     /83  Pulse 72  Temp 98  F (36.7  C) (Oral)  Resp 16  Ht 5' 1.81\" (1.57 m)  Wt 186 lb (84.4 kg)  SpO2 97%  BMI 34.23 kg/m2  Body mass index is 34.23 kg/(m^2).  GENERAL: healthy, alert and no distress  RESP: lungs clear to auscultation - no rales, rhonchi or wheezes  CV: regular rate and rhythm, normal S1 S2, no S3 or S4, no murmur, click or rub, no peripheral edema   MS: no gross musculoskeletal defects noted. Mild tenderness over the right lateral hip, sciatic area.   NEURO: Normal strength and tone, mentation intact and speech normal. Cranial nerves II-XII are grossly intact. DTRs are 2+/4 throughout and symmetric. Gait is stable.   PSYCH: mentation appears normal, affect normal/bright    Results for orders placed or performed in visit on 02/15/18   HEMOGLOBIN A1C   Result Value Ref Range    Hemoglobin A1C 6.3 (H) 4.3 - 6.0 %   CBC with platelets   Result Value Ref Range    WBC 5.6 4.0 - 11.0 10e9/L    RBC Count 4.39 3.8 - 5.2 10e12/L    Hemoglobin 13.3 11.7 - 15.7 g/dL    Hematocrit 40.6 35.0 - 47.0 %    MCV 93 78 - 100 fl    MCH 30.3 26.5 - 33.0 pg    MCHC 32.8 31.5 - 36.5 g/dL    RDW 14.0 10.0 - 15.0 %    Platelet Count 292 150 - 450 10e9/L   TSH with free T4 reflex   Result Value Ref Range    TSH 1.66 0.40 - 4.00 mU/L   Basic metabolic panel  (Ca, Cl, CO2, Creat, Gluc, K, Na, BUN)   Result Value Ref Range    Sodium 141 133 - 144 mmol/L    Potassium 3.8 3.4 - 5.3 mmol/L    Chloride 108 94 - 109 mmol/L    Carbon Dioxide 24 20 - 32 mmol/L    Anion Gap 9 3 - 14 mmol/L    " Glucose 113 (H) 70 - 99 mg/dL    Urea Nitrogen 17 7 - 30 mg/dL    Creatinine 0.63 0.52 - 1.04 mg/dL    GFR Estimate >90 >60 mL/min/1.7m2    GFR Estimate If Black >90 >60 mL/min/1.7m2    Calcium 9.1 8.5 - 10.1 mg/dL         ASSESSMENT/PLAN:       ICD-10-CM    1. Hot flashes R23.2    2. Sweating increase R61 CBC with platelets     TSH with free T4 reflex     Basic metabolic panel  (Ca, Cl, CO2, Creat, Gluc, K, Na, BUN)   3. Hypothyroidism, unspecified type E03.9 TSH with free T4 reflex   4. Type 2 diabetes mellitus without complication, without long-term current use of insulin (H) E11.9 HEMOGLOBIN A1C     Basic metabolic panel  (Ca, Cl, CO2, Creat, Gluc, K, Na, BUN)   5. Hip pain, right M25.551            Completely normal exam except for slight tenderness over the right hip/sciatic area. She had normal lumbar and sacral x-rays last July. I recommend she continue with heat and NSAIDs as needed as symptoms are consistent with a musculoskeletal strain.  I am unsure as to the cause of her hot and cold flashes but I think a menopasual hormonal cause is unlikely, especially since she is 30 years out from a complete hysterectomy. Her A1c is stable along with normal TSH so will continue with current dose of levothyroxine.  I recommend she start to check her glucose during her hot/cold episodes to make sure she is not getting too low. I would like her to send in updated readings in 2 weeks and if symptoms are still not improving, I recommend she follow up with her PCP.  I assured her that a cardiac etiology for her symptoms is very unlikely as she does not describe any chest pain or shortness of breath with a normal cardiac exam. She had a normal EKG in 2016 and a normal stress test in 2012.       Anuj Leung PA-C  [facility]

## 2018-02-15 ENCOUNTER — OFFICE VISIT (OUTPATIENT)
Dept: FAMILY MEDICINE | Facility: OTHER | Age: 68
End: 2018-02-15
Payer: MEDICARE

## 2018-02-15 VITALS
DIASTOLIC BLOOD PRESSURE: 83 MMHG | OXYGEN SATURATION: 97 % | RESPIRATION RATE: 16 BRPM | BODY MASS INDEX: 34.23 KG/M2 | TEMPERATURE: 98 F | HEART RATE: 72 BPM | HEIGHT: 62 IN | SYSTOLIC BLOOD PRESSURE: 133 MMHG | WEIGHT: 186 LBS

## 2018-02-15 DIAGNOSIS — E03.9 HYPOTHYROIDISM, UNSPECIFIED TYPE: ICD-10-CM

## 2018-02-15 DIAGNOSIS — R23.2 HOT FLASHES: Primary | ICD-10-CM

## 2018-02-15 DIAGNOSIS — E11.9 TYPE 2 DIABETES MELLITUS WITHOUT COMPLICATION, WITHOUT LONG-TERM CURRENT USE OF INSULIN (H): ICD-10-CM

## 2018-02-15 DIAGNOSIS — M25.551 HIP PAIN, RIGHT: ICD-10-CM

## 2018-02-15 DIAGNOSIS — R61 SWEATING INCREASE: ICD-10-CM

## 2018-02-15 LAB
ANION GAP SERPL CALCULATED.3IONS-SCNC: 9 MMOL/L (ref 3–14)
BUN SERPL-MCNC: 17 MG/DL (ref 7–30)
CALCIUM SERPL-MCNC: 9.1 MG/DL (ref 8.5–10.1)
CHLORIDE SERPL-SCNC: 108 MMOL/L (ref 94–109)
CO2 SERPL-SCNC: 24 MMOL/L (ref 20–32)
CREAT SERPL-MCNC: 0.63 MG/DL (ref 0.52–1.04)
ERYTHROCYTE [DISTWIDTH] IN BLOOD BY AUTOMATED COUNT: 14 % (ref 10–15)
GFR SERPL CREATININE-BSD FRML MDRD: >90 ML/MIN/1.7M2
GLUCOSE SERPL-MCNC: 113 MG/DL (ref 70–99)
HBA1C MFR BLD: 6.3 % (ref 4.3–6)
HCT VFR BLD AUTO: 40.6 % (ref 35–47)
HGB BLD-MCNC: 13.3 G/DL (ref 11.7–15.7)
MCH RBC QN AUTO: 30.3 PG (ref 26.5–33)
MCHC RBC AUTO-ENTMCNC: 32.8 G/DL (ref 31.5–36.5)
MCV RBC AUTO: 93 FL (ref 78–100)
PLATELET # BLD AUTO: 292 10E9/L (ref 150–450)
POTASSIUM SERPL-SCNC: 3.8 MMOL/L (ref 3.4–5.3)
RBC # BLD AUTO: 4.39 10E12/L (ref 3.8–5.2)
SODIUM SERPL-SCNC: 141 MMOL/L (ref 133–144)
TSH SERPL DL<=0.005 MIU/L-ACNC: 1.66 MU/L (ref 0.4–4)
WBC # BLD AUTO: 5.6 10E9/L (ref 4–11)

## 2018-02-15 PROCEDURE — 36415 COLL VENOUS BLD VENIPUNCTURE: CPT | Performed by: PHYSICIAN ASSISTANT

## 2018-02-15 PROCEDURE — 99214 OFFICE O/P EST MOD 30 MIN: CPT | Performed by: PHYSICIAN ASSISTANT

## 2018-02-15 PROCEDURE — 83036 HEMOGLOBIN GLYCOSYLATED A1C: CPT | Performed by: PHYSICIAN ASSISTANT

## 2018-02-15 PROCEDURE — 85027 COMPLETE CBC AUTOMATED: CPT | Performed by: PHYSICIAN ASSISTANT

## 2018-02-15 PROCEDURE — 80048 BASIC METABOLIC PNL TOTAL CA: CPT | Performed by: PHYSICIAN ASSISTANT

## 2018-02-15 PROCEDURE — 84443 ASSAY THYROID STIM HORMONE: CPT | Performed by: PHYSICIAN ASSISTANT

## 2018-02-15 NOTE — MR AVS SNAPSHOT
After Visit Summary   2/15/2018    Vero Scott    MRN: [id number]           Patient Information     Date Of Birth          1950        Visit Information        Provider Department      2/15/2018 10:15 AM Anuj Leung PA-C; PHONE,  [facility]        Today's Diagnoses     Hot flashes    -  1    Sweating increase        Hypothyroidism, unspecified type        Type 2 diabetes mellitus without complication, without long-term current use of insulin (H)          Care Instructions    We will await your thyroid results because if your thyroid is off, this could be causing your symptoms.  I would also like you to start checking your blood sugars whenever you feel hot and start sweating.  Send us a list of your glucose readings in 2 weeks.  Your diabetes labs today are stable.    Try to wear loose clothing at all times, especially when exercising.  If symptoms are not improving within the next few months, follow up with Dr. Warner.    Continue with a heating pad over your right hip.                  Follow-ups after your visit        Who to contact     If you have questions or need follow up information about today's clinic visit or your schedule please contact [facility] directly at [phone].  Normal or non-critical lab and imaging results will be communicated to you by MyChart, letter or phone within 4 business days after the clinic has received the results. If you do not hear from us within 7 days, please contact the clinic through [employer] or phone. If you have a critical or abnormal lab result, we will notify you by phone as soon as possible.  Submit refill requests through [employer] or call your pharmacy and they will forward the refill request to us. Please allow 3 business days for your refill to be completed.          Additional Information About Your Visit        MyChart Information     [employer] gives you secure access to your electronic  "health record. If you see a primary care provider, you can also send messages to your care team and make appointments. If you have questions, please call your primary care clinic.  If you do not have a primary care provider, please call [phone] and they will assist you.        Care EveryWhere ID     This is your Care EveryWhere ID. This could be used by other organizations to access your [facility] medical records  [id number]        Your Vitals Were     Pulse Temperature Respirations Height Pulse Oximetry BMI (Body Mass Index)    72 98  F (36.7  C) (Oral) 16 5' 1.81\" (1.57 m) 97% 34.23 kg/m2       Blood Pressure from Last 3 Encounters:   02/15/18 146/87   12/15/17 128/81   09/07/17 120/80    Weight from Last 3 Encounters:   02/15/18 186 lb (84.4 kg)   09/07/17 184 lb (83.5 kg)   07/10/17 182 lb (82.6 kg)              We Performed the Following     Basic metabolic panel  (Ca, Cl, CO2, Creat, Gluc, K, Na, BUN)     CBC with platelets     HEMOGLOBIN A1C     TSH with free T4 reflex        Primary Care Provider Office Phone # Fax #    Marie Warner [phone]       [address] [place] [id number]        Equal Access to Services     SHANNAN ALEJANDRO : Hadii aad ku hadasho Soomaali, waaxda luqadaha, qaybta kaalmada adeegyada, hernandez ly haypiercen abraham wilcox . So [facility] [phone].    ATENCIÓN: Si habla español, tiene a berman disposición servicios gratuitos de asistencia lingüística. Llame al [phone].    We comply with applicable federal civil rights laws and [place] laws. We do not discriminate on the basis of race, color, national origin, age, disability, sex, sexual orientation, or gender identity.            Thank you!     Thank you for choosing [facility]  for your care. Our goal is always to provide you with excellent care. Hearing back from our patients is one way we can continue to improve our services. Please take a few minutes to complete the written " survey that you may receive in the mail after your visit with us. Thank you!             Your Updated Medication List - Protect others around you: Learn how to safely use, store and throw away your medicines at www.disposemymeds.org.          This list is accurate as of 2/15/18 12:11 PM.  Always use your most recent med list.                   Brand Name Dispense Instructions for use Diagnosis    aspirin 81 MG EC tablet     120 tablet    TAKE ONE TABLET BY MOUTH EVERY DAY    Hyperlipidemia LDL goal <130       blood glucose lancets standard    no brand specified    1 Box    Use to test blood sugar one  times daily or as directed.    Type 2 diabetes mellitus without complication (H)       blood glucose lancing device     1 each    Use to test blood sugars 1 times daily or as directed.    Impaired fasting glucose       blood glucose monitoring lancets     100 each    TEST DAILY    Osteoporosis, Impaired fasting glucose       blood glucose monitoring meter device kit    no brand specified    1 kit    Use to test blood sugar one  times daily or as directed.    Type 2 diabetes mellitus without complication (H)       * blood glucose monitoring test strip    no brand specified    90 each    Use to test blood sugars one  times daily or as directed    Type 2 diabetes mellitus without complication (H)       * ANGELA CONTOUR test strip   Generic drug:  blood glucose monitoring     50 each    TEST DAILY AS DIRECTED    Impaired fasting glucose       * ANGELA CONTOUR test strip   Generic drug:  blood glucose monitoring     50 each    TEST DAILY AS DIRECTED    Impaired fasting glucose       Blood Pressure Kit     1 kit    Check blood pressures weekly    Benign essential hypertension       calcitonin (salmon) 200 UNIT/ACT nasal spray    MIACALCIN    3.7 mL    SPRAY ONE SPRAY INTO ONE NOSTRIL DAILY (ALTERNATE NOSTRIL DAILY )    Osteoporosis       levothyroxine 75 MCG tablet    SYNTHROID/LEVOTHROID    90 tablet    Take 1 tablet (75 mcg)  by mouth every morning    Hypothyroidism, unspecified type       lovastatin 20 MG tablet    MEVACOR    90 tablet    Take 1 tablet (20 mg) by mouth At Bedtime    Hyperlipidemia LDL goal <130       metFORMIN 500 MG tablet    GLUCOPHAGE    180 tablet    TAKE ONE TABLET BY MOUTH TWICE A DAY WITH MEALS    Type 2 diabetes mellitus without complication, without long-term current use of insulin (H)       omeprazole 20 MG CR capsule    priLOSEC    60 capsule    Take 1 tablet daily for 2 weeks and if still having heartburn symptoms can increase to 1 tablet twice daily.    Gastroesophageal reflux disease, esophagitis presence not specified       order for DME      Equipment being ordered: super feet size c    Plantar fasciitis       ranitidine 150 MG tablet    ZANTAC    180 tablet    TAKE ONE TABLET BY MOUTH TWICE A DAY    Gastroesophageal reflux disease, esophagitis presence not specified       VITAMIN D3 PO      Take 600 Units by mouth daily        * Notice:  This list has 3 medication(s) that are the same as other medications prescribed for you. Read the directions carefully, and ask your doctor or other care provider to review them with you.

## 2018-02-15 NOTE — PATIENT INSTRUCTIONS
We will await your thyroid results because if your thyroid is off, this could be causing your symptoms.  I would also like you to start checking your blood sugars whenever you feel hot and start sweating.  Send us a list of your glucose readings in 2 weeks.  Your diabetes labs today are stable.    Try to wear loose clothing at all times, especially when exercising.  If symptoms are not improving within the next few months, follow up with Dr. Warner.    Continue with a heating pad over your right hip.

## 2018-02-15 NOTE — NURSING NOTE
"Chief Complaint   Patient presents with     Sweats     cold/hot     Panel Management     pcv, flu, height, fall risk, honoring choices, a1c, ace-arb on med list       Initial /87 (BP Location: Left arm, Patient Position: Chair, Cuff Size: Adult Large)  Pulse 72  Temp 98  F (36.7  C) (Oral)  Resp 16  Ht 5' 1.81\" (1.57 m)  Wt 186 lb (84.4 kg)  SpO2 97%  BMI 34.23 kg/m2 Estimated body mass index is 34.23 kg/(m^2) as calculated from the following:    Height as of this encounter: 5' 1.81\" (1.57 m).    Weight as of this encounter: 186 lb (84.4 kg).  Medication Reconciliation: complete   Erlinda Harvey CMA      "

## 2018-04-30 DIAGNOSIS — E11.9 TYPE 2 DIABETES MELLITUS WITHOUT COMPLICATION, WITHOUT LONG-TERM CURRENT USE OF INSULIN (H): ICD-10-CM

## 2018-04-30 DIAGNOSIS — R73.01 IMPAIRED FASTING GLUCOSE: ICD-10-CM

## 2018-04-30 DIAGNOSIS — E78.5 HYPERLIPIDEMIA LDL GOAL <130: ICD-10-CM

## 2018-04-30 DIAGNOSIS — M81.0 OSTEOPOROSIS: ICD-10-CM

## 2018-05-01 NOTE — TELEPHONE ENCOUNTER
"Requested Prescriptions   Pending Prescriptions Disp Refills     blood glucose monitoring (ANGELA MICROLET) lancets [Pharmacy Med Name: ANGELA MICROLET LANCETS  MISC] 100 each 5     Sig: TEST DAILY    Diabetic Supplies Protocol Passed    4/30/2018  1:03 PM       Passed - Patient is 18 years of age or older       Passed - Recent (6 mo) or future (30 days) visit within the authorizing provider's specialty    Patient had office visit in the last 6 months or has a visit in the next 30 days with authorizing provider.  See \"Patient Info\" tab in inbasket, or \"Choose Columns\" in Meds & Orders section of the refill encounter.            metFORMIN (GLUCOPHAGE) 500 MG tablet [Pharmacy Med Name: METFORMIN HCL 500MG TABS] 180 tablet 1     Sig: TAKE ONE TABLET BY MOUTH TWICE A DAY WITH MEALS    Biguanide Agents Failed    4/30/2018  1:03 PM       Failed - Patient has documented LDL within the past 12 mos.    Recent Labs   Lab Test  03/22/17   1100   LDL  82            Passed - Blood pressure less than 140/90 in past 6 months    BP Readings from Last 3 Encounters:   02/15/18 133/83   12/15/17 128/81   09/07/17 120/80          Passed - Patient has had a Microalbumin in the past 12 mos.    Recent Labs   Lab Test  09/07/17   1220   MICROL  <5   UMALCR  Unable to calculate due to low value            Passed - Patient is age 10 or older       Passed - Patient has documented A1c within the specified period of time.    Recent Labs   Lab Test  02/15/18   1112   A1C  6.3*            Passed - Patient's CR is NOT>1.4 OR Patient's EGFR is NOT<45 within past 12 mos.    Recent Labs   Lab Test  02/15/18   1112   GFRESTIMATED  >90   GFRESTBLACK  >90       Recent Labs   Lab Test  02/15/18   1112   CR  0.63            Passed - Patient does NOT have a diagnosis of CHF.       Passed - Patient is not pregnant       Passed - Patient has not had a positive pregnancy test within the past 12 mos.        Passed - Recent (6 mo) or future (30 days) visit within " "the authorizing provider's specialty    Patient had office visit in the last 6 months or has a visit in the next 30 days with authorizing provider or within the authorizing provider's specialty.  See \"Patient Info\" tab in inbasket, or \"Choose Columns\" in Meds & Orders section of the refill encounter.            lovastatin (MEVACOR) 20 MG tablet [Pharmacy Med Name: LOVASTATIN 20MG TABS] 30 tablet 0     Sig: TAKE ONE TABLET BY MOUTH AT BEDTIME (DUE FOR FASTING LABS)    Statins Protocol Failed    4/30/2018  1:03 PM       Failed - LDL on file in past 12 months    Recent Labs   Lab Test  03/22/17   1100   LDL  82          Passed - No abnormal creatine kinase in past 12 months    No lab results found.          Passed - Recent (12 mo) or future (30 days) visit within the authorizing provider's specialty    Patient had office visit in the last 12 months or has a visit in the next 30 days with authorizing provider or within the authorizing provider's specialty.  See \"Patient Info\" tab in inbasket, or \"Choose Columns\" in Meds & Orders section of the refill encounter.           Passed - Patient is age 18 or older       Passed - No active pregnancy on record       Passed - No positive pregnancy test in past 12 months        blood glucose (NO BRAND SPECIFIED) lancets standard  Rx was sent 03/05/2018 for 100 tabs and 5 refills.   Pharmacy notified via E-prescribe refusal.  Beryl Ames RN, BSN       metFORMIN (GLUCOPHAGE) 500 MG tablet  Routing refill request to provider for review/approval because:  Penny given x1 and patient did not follow up, please advise  Labs not current:  Fating lipid panel    lovastatin (MEVACOR) 20 MG tablet  Routing refill request to provider for review/approval because:  Penny given x1 and patient did not follow up, please advise  Labs not current:  Fating lipid panel    Beryl Ames RN, BSN           "

## 2018-05-03 RX ORDER — LOVASTATIN 20 MG
TABLET ORAL
Qty: 30 TABLET | Refills: 0 | OUTPATIENT
Start: 2018-05-03

## 2018-05-03 NOTE — TELEPHONE ENCOUNTER
Patient needs appointment for follow-up on diabetes.  Please help schedule appointment before further refills.

## 2018-05-03 NOTE — TELEPHONE ENCOUNTER
Called and spoke with patient regarding message below.  Scheduled patient for 5/7/18 @ 2:00 pm for diabetes recheck.  Peggy Mustafa CMA ([facility])

## 2018-05-04 NOTE — PROGRESS NOTES
SUBJECTIVE:   Vero Scott is a 67 year old female who presents to clinic today for the following health issues:      HPI     Diabetes Follow-up    Patient is checking blood sugars: once daily.  Results are as follows:         am - 120's    Diabetic concerns: None     Symptoms of hypoglycemia (low blood sugar): none     Paresthesias (numbness or burning in feet) or sores: No     Date of last diabetic eye exam:     BP Readings from Last 2 Encounters:   05/07/18 126/71   02/15/18 133/83     Hemoglobin A1C (%)   Date Value   02/15/2018 6.3 (H)   09/07/2017 6.0     LDL Cholesterol Calculated (mg/dL)   Date Value   03/22/2017 82   02/09/2016 76     Problem list and histories reviewed & adjusted, as indicated.  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 active tuberculosis     Anxiety state     Other osteoporosis     Hypothyroidism     Adenomatous polyp of colon     Hyperlipidemia LDL goal <130     Advanced directives, counseling/discussion     Type 2 diabetes mellitus without complication (H)     Obstructive sleep apnea     Patellofemoral disorder of right knee     Rotator cuff syndrome     Pulmonary nodule     Obesity, Class II, BMI 35-39.9     Female stress incontinence     Plantar fasciitis     Bloating     Ventral hernia without obstruction or gangrene     Pelvic pain     Past Surgical History:   Procedure Laterality Date     C MAMMOGRAM, SCREENING  2003, 2002     C TOTAL ABDOM HYSTERECTOMY  2002    with BSO, for cervical dysplasia, no malignancy on biopsy     COLONOSCOPY  11/10/2010    COMBINED COLONOSCOPY, REMOVE TUMOR/POLYP/LESION BY SNARE performed by JOHNATHON BHATT at [facility], recheck in 3 years     COLONOSCOPY N/A 4/10/2015    Procedure: COMBINED COLONOSCOPY, SINGLE OR MULTIPLE BIOPSY/POLYPECTOMY BY BIOPSY;  Surgeon: Johnathon Bhatt MD;  Location: [facility]      COMBINED ESOPHAGOSCOPY, GASTROSCOPY, DUODENOSCOPY (EGD) WITH CO2 INSUFFLATION Bilateral 6/22/2016    Procedure: COMBINED ESOPHAGOSCOPY, GASTROSCOPY, DUODENOSCOPY (EGD) WITH CO2 INSUFFLATION;  Surgeon: Duane, William Charles, MD;  Location: [facility] OR     CONIZATION CERVIX,KNIFE/LASER  2002    CIS     ESOPHAGOSCOPY, GASTROSCOPY, DUODENOSCOPY (EGD), COMBINED N/A 6/22/2016    Procedure: COMBINED ESOPHAGOSCOPY, GASTROSCOPY, DUODENOSCOPY (EGD), BIOPSY SINGLE OR MULTIPLE;  Surgeon: Duane, William Charles, MD;  Location: [facility] OR     HC COLONOSCOPY THRU STOMA W BIOPSY/CAUTERY TUMOR/POLYP/LESION  1/10/2006    adenomatous, recheck in 5 years     HC UGI ENDOSCOPY DIAG W BIOPSY  11/22/05     HC UGI ENDOSCOPY, SIMPLE EXAM  12/03    abnormal     HCL PAP SMEAR  2002     HYSTERECTOMY, PAP NO LONGER INDICATED       SLING TRANSVAGINAL N/A 3/31/2016    Procedure: SLING TRANSVAGINAL;  Surgeon: González Wright MD;  Location: [facility] OR       Social History   Substance Use Topics     Smoking status: Never Smoker     Smokeless tobacco: Never Used      Comment: no smokers in household     Alcohol use Yes      Comment: occ     Family History   Problem Relation Age of Onset     CANCER No family hx of      breast or colon     DIABETES No family hx of      Hypertension No family hx of      HEART DISEASE No family hx of      CAD     Breast Cancer No family hx of      Depression No family hx of      Substance Abuse No family hx of      Anesthesia Reaction No family hx of      Genetic Disorder No family hx of          Current Outpatient Prescriptions   Medication Sig Dispense Refill     aspirin 81 MG EC tablet TAKE ONE TABLET BY MOUTH EVERY  tablet 2     ANGELA CONTOUR test strip TEST DAILY AS DIRECTED 50 each 3     blood glucose monitoring (ANGELA MICROLET) lancets TEST DAILY 100 each 5     blood glucose monitoring (NO BRAND SPECIFIED) meter device kit Use to test blood sugar one  times daily or as directed. 1 kit 0     blood glucose monitoring (NO  BRAND SPECIFIED) test strip Use to test blood sugars one  times daily or as directed 90 each 3     Blood Pressure KIT Check blood pressures weekly 1 kit 0     calcitonin, salmon, (MIACALCIN) 200 UNIT/ACT nasal spray USE ONE SPRAY INTO ONE NOSTRIL DAILY **ALTERNATE NOSTRIL DAILY** 3.7 mL 9     Cholecalciferol (VITAMIN D3 PO) Take 600 Units by mouth daily       levothyroxine (SYNTHROID/LEVOTHROID) 75 MCG tablet TAKE ONE TABLET BY MOUTH EVERY MORNING 90 tablet 2     lovastatin (MEVACOR) 20 MG tablet TAKE ONE TABLET BY MOUTH AT BEDTIME 30 tablet 0     metFORMIN (GLUCOPHAGE) 500 MG tablet TAKE ONE TABLET BY MOUTH TWICE A DAY WITH MEALS 60 tablet 0     ORDER FOR DME Equipment being ordered: super feet size c       omeprazole (PRILOSEC) 20 MG capsule Take 1 tablet daily for 2 weeks and if still having heartburn symptoms can increase to 1 tablet twice daily. (Patient not taking: Reported on 2/15/2018) 60 capsule 1     ranitidine (ZANTAC) 150 MG tablet TAKE ONE TABLET BY MOUTH TWICE A DAY (Patient not taking: Reported on 2/15/2018) 180 tablet 3     Allergies   Allergen Reactions     Sulfa Drugs      n&v     Recent Labs   Lab Test  05/09/18   0826  02/15/18   1112  09/07/17   1220  03/22/17   1100  08/23/16   0943  02/09/16   1324   09/09/14   1128   A1C  6.1*  6.3*  6.0  6.3*  6.2*  6.4*   < >  6.3*   LDL  60   --    --   82   --   76   --   77   HDL  58   --    --   62   --   61   --   71   TRIG  77   --    --   82   --   73   --   84   ALT   --    --   28   --   31   --    --   27   CR   --   0.63  0.70   --   0.66  0.67   --   0.68   GFRESTIMATED   --   >90  84   --   >90  Non  GFR Calc    88   --   88   GFRESTBLACK   --   >90  >90   --   >90   GFR Calc    >90   GFR Calc     --   >90   GFR Calc     POTASSIUM   --   3.8  3.9   --   4.2   --    --   3.8   TSH   --   1.66   --   1.87   --   3.79   < >  1.11    < > = values in this interval not displayed.      BP  "Readings from Last 3 Encounters:   05/07/18 126/71   02/15/18 133/83   12/15/17 128/81    Wt Readings from Last 3 Encounters:   05/07/18 185 lb (83.9 kg)   02/15/18 186 lb (84.4 kg)   09/07/17 184 lb (83.5 kg)                  Labs reviewed in EPIC    ROS:  Constitutional, HEENT, cardiovascular, pulmonary, gi and gu systems are negative, except as otherwise noted.    OBJECTIVE:     /71 (BP Location: Right arm, Patient Position: Chair, Cuff Size: Adult Large)  Pulse 66  Temp 98  F (36.7  C) (Temporal)  Resp 16  Ht 5' 1.8\" (1.57 m)  Wt 185 lb (83.9 kg)  SpO2 100%  BMI 34.06 kg/m2  Body mass index is 34.06 kg/(m^2).   Physical Exam   Constitutional: She is oriented to person, place, and time. She appears well-developed and well-nourished.   HENT:   Head: Normocephalic and atraumatic.   Right Ear: External ear normal.   Left Ear: External ear normal.   Cardiovascular: Normal rate, regular rhythm and normal heart sounds.  Exam reveals no gallop.    No murmur heard.  Pulmonary/Chest: Effort normal and breath sounds normal.   Neurological: She is alert and oriented to person, place, and time.   Psychiatric: She has a normal mood and affect.         Diagnostic Test Results:  none     ASSESSMENT/PLAN:     Problem List Items Addressed This Visit     Type 2 diabetes mellitus without complication (H)     Well controlled diabetes. Continue current meds           Relevant Orders    FOOT EXAM  NO CHARGE [87545.114] (Completed)    Lipid panel reflex to direct LDL Fasting (Completed)    Hemoglobin A1c (Completed)    Pelvic pain - Primary     She reports having right sided pelvic pain. She is s/p hysterectomy but she is not sure if she still has her ovaries. Will get ultrasound to r/o any ovarian pathology.Last colonoscopy in 2015 with a small tubular adenoma. Needs repeat in 2020. No changes with bowel movement to be concernced about any changes. She has had long standing history of urinary incontinence and also had a " sling surgery performed by . If the pelvic exam is normal, I suspect her symptoms could be related to her bladder. Will refer to urology if needed         Relevant Orders    US Pelvic Complete w Transvaginal (Completed)      Other Visit Diagnoses     Screening for diabetic peripheral neuropathy        Relevant Orders    FOOT EXAM  NO CHARGE [84932.114] (Completed)    Need for prophylactic vaccination against Streptococcus pneumoniae (pneumococcus)               Marie Warner MD  [facility]  Answers for HPI/ROS submitted by the patient on 5/7/2018   PHQ-2 Score: 1

## 2018-05-07 ENCOUNTER — OFFICE VISIT (OUTPATIENT)
Dept: FAMILY MEDICINE | Facility: OTHER | Age: 68
End: 2018-05-07
Payer: COMMERCIAL

## 2018-05-07 VITALS
SYSTOLIC BLOOD PRESSURE: 126 MMHG | HEART RATE: 66 BPM | DIASTOLIC BLOOD PRESSURE: 71 MMHG | HEIGHT: 62 IN | OXYGEN SATURATION: 100 % | WEIGHT: 185 LBS | TEMPERATURE: 98 F | BODY MASS INDEX: 34.04 KG/M2 | RESPIRATION RATE: 16 BRPM

## 2018-05-07 DIAGNOSIS — R10.2 PELVIC PAIN: Primary | ICD-10-CM

## 2018-05-07 DIAGNOSIS — E11.9 TYPE 2 DIABETES MELLITUS WITHOUT COMPLICATION, WITHOUT LONG-TERM CURRENT USE OF INSULIN (H): ICD-10-CM

## 2018-05-07 DIAGNOSIS — Z23 NEED FOR PROPHYLACTIC VACCINATION AGAINST STREPTOCOCCUS PNEUMONIAE (PNEUMOCOCCUS): ICD-10-CM

## 2018-05-07 DIAGNOSIS — Z13.89 SCREENING FOR DIABETIC PERIPHERAL NEUROPATHY: ICD-10-CM

## 2018-05-07 PROCEDURE — 99214 OFFICE O/P EST MOD 30 MIN: CPT | Performed by: FAMILY MEDICINE

## 2018-05-07 PROCEDURE — 99207 C FOOT EXAM  NO CHARGE: CPT | Performed by: FAMILY MEDICINE

## 2018-05-07 ASSESSMENT — PAIN SCALES - GENERAL: PAINLEVEL: MODERATE PAIN (4)

## 2018-05-07 NOTE — MR AVS SNAPSHOT
After Visit Summary   5/7/2018    Vero Scott    MRN: [id number]           Patient Information     Date Of Birth          1950        Visit Information        Provider Department      5/7/2018 1:45 PM Marie Warner MD; PHONE,  [facility]        Today's Diagnoses     Pelvic pain    -  1    Screening for diabetic peripheral neuropathy        Need for prophylactic vaccination against Streptococcus pneumoniae (pneumococcus)        Type 2 diabetes mellitus without complication, without long-term current use of insulin (H)           Follow-ups after your visit        Follow-up notes from your care team     Return in about 5 months (around 10/7/2018).      Your next 10 appointments already scheduled     May 09, 2018  8:30 AM CDT   US PELVIC COMPLETE W TRANSVAGINAL with ERUS1   [facility] ([facility])    [address] [place] [id number] [phone]           Please bring a list of your medicines (including vitamins, minerals and over-the-counter drugs). Also, tell your doctor about any allergies you may have. Wear comfortable clothes and leave your valuables at home.  Adults: Drink six 8-ounce glasses of fluid one hour before your exam. Do NOT empty your bladder.  If you need to empty your bladder before your exam, try to release only a little bit of urine. Then, drink another 8oz glass of fluid.  Children: Children who are potty trained should drink at least 4 cups (32 oz) of liquid 45 minutes to one hour prior to the exam. The child s bladder must be full in order to achieve a diagnostic exam. If your child is very uncomfortable or has an urgent need to pee, please notify a technologist; they will try to find out how much longer the wait may be and provide instructions to help relieve the pressure. Occasionally it is medically necessary to insert a urinary catheter to fill the bladder.  Please call the Imaging Department at  your exam site with any questions.            May 09, 2018  9:15 AM CDT   LAB with NL LAB [facility]   [facility] ([facility]    [address] [facility] [id number] [phone]           Please do not eat 10-12 hours before your appointment if you are coming in fasting for labs on lipids, cholesterol, or glucose (sugar). This does not apply to pregnant women. Water, hot tea and black coffee (with nothing added) are okay. Do not drink other fluids, diet soda or chew gum.              Future tests that were ordered for you today     Open Future Orders        Priority Expected Expires Ordered    Lipid panel reflex to direct LDL Fasting Routine  5/7/2019 5/7/2018    US Pelvic Complete w Transvaginal Routine  5/7/2019 5/7/2018    Hemoglobin A1c Routine  5/7/2019 5/7/2018            Who to contact     If you have questions or need follow up information about today's clinic visit or your schedule please contact [facility] directly at [phone].  Normal or non-critical lab and imaging results will be communicated to you by Wedge Busterhart, letter or phone within 4 business days after the clinic has received the results. If you do not hear from us within 7 days, please contact the clinic through [facility] L[facility] or phone. If you have a critical or abnormal lab result, we will notify you by phone as soon as possible.  Submit refill requests through Punchh or call your pharmacy and they will forward the refill request to us. Please allow 3 business days for your refill to be completed.          Additional Information About Your Visit        Punchh Information     P[facility] gives you secure access to your electronic health record. If you see a primary care provider, you can also send messages to your care team and make appointments. If you have questions, please call your primary care clinic.  If you do not have a primary care provider, please call [phone] and they will assist you.       "  Care EveryWhere ID     This is your Care EveryWhere ID. This could be used by other organizations to access your [facility] medical records  [id number]        Your Vitals Were     Pulse Temperature Respirations Height Pulse Oximetry BMI (Body Mass Index)    66 98  F (36.7  C) (Temporal) 16 5' 1.8\" (1.57 m) 100% 34.06 kg/m2       Blood Pressure from Last 3 Encounters:   05/07/18 126/71   02/15/18 133/83   12/15/17 128/81    Weight from Last 3 Encounters:   05/07/18 185 lb (83.9 kg)   02/15/18 186 lb (84.4 kg)   09/07/17 184 lb (83.5 kg)              We Performed the Following     FOOT EXAM  NO CHARGE [29628.114]        Primary Care Provider Office Phone # Fax #    Marie Warner [phone]       [address] [facility] [address]  [facility] [address]        Equal Access to Services     [facility] CenterANIKET : Hadii diana ku hadasho Sodemarcus, waaxda luqadaha, qaybta kaalmada adeegyada, hernandez wilcox . So [facility] [phone].    ATENCIÓN: Si lucillela aislinn, tiene a berman disposición servicios gratuitos de asistencia lingüística. Llame al [phone].    We comply with applicable federal civil rights laws and [place] laws. We do not discriminate on the basis of race, color, national origin, age, disability, sex, sexual orientation, or gender identity.            Thank you!     Thank you for choosing [facility]  for your care. Our goal is always to provide you with excellent care. Hearing back from our patients is one way we can continue to improve our services. Please take a few minutes to complete the written survey that you may receive in the mail after your visit with us. Thank you!             Your Updated Medication List - Protect others around you: Learn how to safely use, store and throw away your medicines at www.disposemymeds.org.          This list is accurate as of 5/7/18  3:29 PM.  Always use your most recent med list.                   Brand Name Dispense Instructions " for use Diagnosis    aspirin 81 MG EC tablet     120 tablet    TAKE ONE TABLET BY MOUTH EVERY DAY    Hyperlipidemia LDL goal <130       blood glucose monitoring lancets     100 each    TEST DAILY    Osteoporosis, Impaired fasting glucose       blood glucose monitoring meter device kit    no brand specified    1 kit    Use to test blood sugar one  times daily or as directed.    Type 2 diabetes mellitus without complication (H)       * blood glucose monitoring test strip    no brand specified    90 each    Use to test blood sugars one  times daily or as directed    Type 2 diabetes mellitus without complication (H)       * ANGELA CONTOUR test strip   Generic drug:  blood glucose monitoring     50 each    TEST DAILY AS DIRECTED    Impaired fasting glucose       Blood Pressure Kit     1 kit    Check blood pressures weekly    Benign essential hypertension       calcitonin (salmon) 200 UNIT/ACT nasal spray    MIACALCIN    3.7 mL    USE ONE SPRAY INTO ONE NOSTRIL DAILY **ALTERNATE NOSTRIL DAILY**    Osteoporosis, unspecified osteoporosis type, unspecified pathological fracture presence       levothyroxine 75 MCG tablet    SYNTHROID/LEVOTHROID    90 tablet    TAKE ONE TABLET BY MOUTH EVERY MORNING    Hypothyroidism, unspecified type       lovastatin 20 MG tablet    MEVACOR    30 tablet    TAKE ONE TABLET BY MOUTH AT BEDTIME    Hyperlipidemia LDL goal <130       metFORMIN 500 MG tablet    GLUCOPHAGE    60 tablet    TAKE ONE TABLET BY MOUTH TWICE A DAY WITH MEALS    Type 2 diabetes mellitus without complication, without long-term current use of insulin (H)       omeprazole 20 MG CR capsule    priLOSEC    60 capsule    Take 1 tablet daily for 2 weeks and if still having heartburn symptoms can increase to 1 tablet twice daily.    Gastroesophageal reflux disease, esophagitis presence not specified       order for DME      Equipment being ordered: super feet size c    Plantar fasciitis       ranitidine 150 MG tablet    ZANTAC     180 tablet    TAKE ONE TABLET BY MOUTH TWICE A DAY    Gastroesophageal reflux disease, esophagitis presence not specified       VITAMIN D3 PO      Take 600 Units by mouth daily        * Notice:  This list has 2 medication(s) that are the same as other medications prescribed for you. Read the directions carefully, and ask your doctor or other care provider to review them with you.

## 2018-05-09 ENCOUNTER — RADIANT APPOINTMENT (OUTPATIENT)
Dept: ULTRASOUND IMAGING | Facility: OTHER | Age: 68
End: 2018-05-09
Attending: FAMILY MEDICINE
Payer: COMMERCIAL

## 2018-05-09 DIAGNOSIS — E11.9 TYPE 2 DIABETES MELLITUS WITHOUT COMPLICATION, WITHOUT LONG-TERM CURRENT USE OF INSULIN (H): ICD-10-CM

## 2018-05-09 DIAGNOSIS — R10.2 PELVIC PAIN: ICD-10-CM

## 2018-05-09 LAB
CHOLEST SERPL-MCNC: 133 MG/DL
HBA1C MFR BLD: 6.1 % (ref 0–5.6)
HDLC SERPL-MCNC: 58 MG/DL
LDLC SERPL CALC-MCNC: 60 MG/DL
NONHDLC SERPL-MCNC: 75 MG/DL
TRIGL SERPL-MCNC: 77 MG/DL

## 2018-05-09 PROCEDURE — 36415 COLL VENOUS BLD VENIPUNCTURE: CPT | Performed by: FAMILY MEDICINE

## 2018-05-09 PROCEDURE — 83036 HEMOGLOBIN GLYCOSYLATED A1C: CPT | Performed by: FAMILY MEDICINE

## 2018-05-09 PROCEDURE — 76856 US EXAM PELVIC COMPLETE: CPT

## 2018-05-09 PROCEDURE — 76830 TRANSVAGINAL US NON-OB: CPT

## 2018-05-09 PROCEDURE — 80061 LIPID PANEL: CPT | Performed by: FAMILY MEDICINE

## 2018-05-13 PROBLEM — R10.2 PELVIC PAIN: Status: ACTIVE | Noted: 2018-05-13

## 2018-05-14 NOTE — ASSESSMENT & PLAN NOTE
She reports having right sided pelvic pain. She is s/p hysterectomy but she is not sure if she still has her ovaries. Will get ultrasound to r/o any ovarian pathology.Last colonoscopy in 2015 with a small tubular adenoma. Needs repeat in 2020. No changes with bowel movement to be concernced about any changes. She has had long standing history of urinary incontinence and also had a sling surgery performed by . If the pelvic exam is normal, I suspect her symptoms could be related to her bladder. Will refer to urology if needed

## 2018-06-06 DIAGNOSIS — R73.01 IMPAIRED FASTING GLUCOSE: ICD-10-CM

## 2018-06-06 DIAGNOSIS — M81.0 OSTEOPOROSIS: ICD-10-CM

## 2018-06-06 DIAGNOSIS — E11.9 TYPE 2 DIABETES MELLITUS WITHOUT COMPLICATION, WITHOUT LONG-TERM CURRENT USE OF INSULIN (H): ICD-10-CM

## 2018-06-06 DIAGNOSIS — E78.5 HYPERLIPIDEMIA LDL GOAL <130: ICD-10-CM

## 2018-06-07 RX ORDER — LOVASTATIN 20 MG
20 TABLET ORAL AT BEDTIME
Qty: 30 TABLET | Refills: 3 | Status: SHIPPED | OUTPATIENT
Start: 2018-06-07 | End: 2018-10-04

## 2018-06-07 NOTE — TELEPHONE ENCOUNTER
"Requested Prescriptions   Pending Prescriptions Disp Refills     ANGELA CONTOUR test strip [Pharmacy Med Name: ANGELA CONTOUR TEST  STRP] 50 each 3     Sig: TEST DAILY AS DIRECTED    Diabetic Supplies Protocol Passed    6/6/2018  5:53 PM       Passed - Patient is 18 years of age or older       Passed - Recent (6 mo) or future (30 days) visit within the authorizing provider's specialty    Patient had office visit in the last 6 months or has a visit in the next 30 days with authorizing provider.  See \"Patient Info\" tab in inbasket, or \"Choose Columns\" in Meds & Orders section of the refill encounter.            metFORMIN (GLUCOPHAGE) 500 MG tablet [Pharmacy Med Name: METFORMIN HCL 500MG TABS] 60 tablet 0     Sig: TAKE ONE TABLET BY MOUTH TWICE A DAY WITH MEALS (DUE FOR FASTING LABS )    Biguanide Agents Passed    6/6/2018  5:53 PM       Passed - Blood pressure less than 140/90 in past 6 months    BP Readings from Last 3 Encounters:   05/07/18 126/71   02/15/18 133/83   12/15/17 128/81                Passed - Patient has documented LDL within the past 12 mos.    Recent Labs   Lab Test  05/09/18   0826   LDL  60            Passed - Patient has had a Microalbumin in the past 12 mos.    Recent Labs   Lab Test  09/07/17   1220   MICROL  <5   UMALCR  Unable to calculate due to low value            Passed - Patient is age 10 or older       Passed - Patient has documented A1c within the specified period of time.    If HgbA1C is 8 or greater, it needs to be on file within the past 3 months.  If less than 8, must be on file within the past 6 months.     Recent Labs   Lab Test  05/09/18   0826   A1C  6.1*            Passed - Patient's CR is NOT>1.4 OR Patient's EGFR is NOT<45 within past 12 mos.    Recent Labs   Lab Test  02/15/18   1112   GFRESTIMATED  >90   GFRESTBLACK  >90       Recent Labs   Lab Test  02/15/18   1112   CR  0.63            Passed - Patient does NOT have a diagnosis of CHF.       Passed - Patient is not pregnant " "      Passed - Patient has not had a positive pregnancy test within the past 12 mos.        Passed - Recent (6 mo) or future (30 days) visit within the authorizing provider's specialty    Patient had office visit in the last 6 months or has a visit in the next 30 days with authorizing provider or within the authorizing provider's specialty.  See \"Patient Info\" tab in inbasket, or \"Choose Columns\" in Meds & Orders section of the refill encounter.            blood glucose monitoring (ANGELA MICROLET) lancets [Pharmacy Med Name: ANGELA MICROLET LANCETS  MISC] 100 each 5     Sig: TEST DAILY    Diabetic Supplies Protocol Passed    6/6/2018  5:53 PM       Passed - Patient is 18 years of age or older       Passed - Recent (6 mo) or future (30 days) visit within the authorizing provider's specialty    Patient had office visit in the last 6 months or has a visit in the next 30 days with authorizing provider.  See \"Patient Info\" tab in inbasket, or \"Choose Columns\" in Meds & Orders section of the refill encounter.            lovastatin (MEVACOR) 20 MG tablet [Pharmacy Med Name: LOVASTATIN 20MG TABS] 30 tablet 0     Sig: TAKE ONE TABLET BY MOUTH AT BEDTIME (DUE FOR FASTING LABS)    Statins Protocol Passed    6/6/2018  5:53 PM       Passed - LDL on file in past 12 months    Recent Labs   Lab Test  05/09/18   0826   LDL  60            Passed - No abnormal creatine kinase in past 12 months    No lab results found.            Passed - Recent (12 mo) or future (30 days) visit within the authorizing provider's specialty    Patient had office visit in the last 12 months or has a visit in the next 30 days with authorizing provider or within the authorizing provider's specialty.  See \"Patient Info\" tab in inbasket, or \"Choose Columns\" in Meds & Orders section of the refill encounter.           Passed - Patient is age 18 or older       Passed - No active pregnancy on record       Passed - No positive pregnancy test in past 12 months    "     Prescription approved per [facility] Refill Protocol.    Yasmin Gimenez RN

## 2018-08-08 DIAGNOSIS — R73.01 IMPAIRED FASTING GLUCOSE: ICD-10-CM

## 2018-08-08 DIAGNOSIS — M81.0 OSTEOPOROSIS: ICD-10-CM

## 2018-08-09 NOTE — TELEPHONE ENCOUNTER
"Requested Prescriptions   Pending Prescriptions Disp Refills     ANGELA CONTOUR test strip [Pharmacy Med Name: ANGELA CONTOUR TEST  STRP] 50 each 3     Sig: TEST DAILY AS DIRECTED    Diabetic Supplies Protocol Passed    8/8/2018  6:49 PM       Passed - Patient is 18 years of age or older       Passed - Recent (6 mo) or future (30 days) visit within the authorizing provider's specialty    Patient had office visit in the last 6 months or has a visit in the next 30 days with authorizing provider.  See \"Patient Info\" tab in inbasket, or \"Choose Columns\" in Meds & Orders section of the refill encounter.            blood glucose monitoring (ANGELA MICROLET) lancets [Pharmacy Med Name: ANGELA MICROLET LANCETS  MISC] 100 each 5     Sig: TEST DAILY    Diabetic Supplies Protocol Passed    8/8/2018  6:49 PM       Passed - Patient is 18 years of age or older       Passed - Recent (6 mo) or future (30 days) visit within the authorizing provider's specialty    Patient had office visit in the last 6 months or has a visit in the next 30 days with authorizing provider.  See \"Patient Info\" tab in inbasket, or \"Choose Columns\" in Meds & Orders section of the refill encounter.            Prescription approved per [facility] Refill Protocol.    Per LOV to follow up 10/2018  Beryl Ames, RN, BSN     "

## 2018-10-04 DIAGNOSIS — K21.9 GASTROESOPHAGEAL REFLUX DISEASE, ESOPHAGITIS PRESENCE NOT SPECIFIED: ICD-10-CM

## 2018-10-04 DIAGNOSIS — E11.9 TYPE 2 DIABETES MELLITUS WITHOUT COMPLICATION, WITHOUT LONG-TERM CURRENT USE OF INSULIN (H): ICD-10-CM

## 2018-10-04 DIAGNOSIS — E78.5 HYPERLIPIDEMIA LDL GOAL <130: ICD-10-CM

## 2018-10-04 RX ORDER — LOVASTATIN 20 MG
TABLET ORAL
Qty: 30 TABLET | Refills: 0 | Status: SHIPPED | OUTPATIENT
Start: 2018-10-04 | End: 2018-10-30

## 2018-10-04 NOTE — TELEPHONE ENCOUNTER
Zantac, Lovastatin, Metformin:  Prescription approved per [facility] Refill Protocol.    Latrice Barriga, RN, BSN

## 2018-10-25 ENCOUNTER — TRANSFERRED RECORDS (OUTPATIENT)
Dept: HEALTH INFORMATION MANAGEMENT | Facility: CLINIC | Age: 68
End: 2018-10-25

## 2018-10-25 LAB — RETINOPATHY: NEGATIVE

## 2018-11-05 NOTE — PROGRESS NOTES
"SUBJECTIVE:   Vero Scott is a 68 year old female who presents for Preventive Visit.    Are you in the first 12 months of your Medicare coverage?  No    Annual Wellness Visit     In general, how would you rate your overall health?  Good    Frequency of exercise:  4-5 days/week    Duration of exercise:  30-45 minutes    Do you usually eat at least 4 servings of fruit and vegetables a day, include whole grains    & fiber and avoid regularly eating high fat or \"junk\" foods?  Yes    Taking medications regularly:  Yes    Medication side effects:  None    Ability to successfully perform activities of daily living:  No assistance needed    Home Safety:  No safety concerns identified    Hearing Impairment:  No hearing concerns    In the past 6 months, have you been bothered by leaking of urine?  No    In general, how would you rate your overall mental or emotional health?  Good    PHQ-2 Total Score: 1    Additional concerns today:  Yes      Ability to successfully perform activities of daily living: Yes, no assistance needed  Home safety:  none identified   Hearing impairment: Yes, Difficulty following a conversation in a noisy restaurant or crowded room.    Fall risk:  Fallen 2 or more times in the past year?: No  Any fall with injury in the past year?: No    COGNITIVE SCREEN  1) Repeat 3 items (Leader, Season, Table)    2) Clock draw: NORMAL  3) 3 item recall: Recalls 3 objects  Results: 3 items recalled: COGNITIVE IMPAIRMENT LESS LIKELY    Mini-CogTM Copyright BIPIN Caruso. Licensed by the author for use in [facility]; reprinted with permission (marla@.[facility]). All rights reserved.        Reviewed and updated as needed this visit by clinical staff         Reviewed and updated as needed this visit by Provider        Social History   Substance Use Topics     Smoking status: Never Smoker     Smokeless tobacco: Never Used      Comment: no smokers in household     Alcohol use Yes      Comment: occ       Do you feel " safe in your environment - Yes    Do you have a Health Care Directive?: No: Advance care planning reviewed with patient; information given to patient to review.    Current providers sharing in care for this patient include:   Patient Care Team:  Marie Warner MD as PCP - General (Family Practice)    The following health maintenance items are reviewed in Epic and correct as of today:  Health Maintenance   Topic Date Due     ADVANCE DIRECTIVE PLANNING Q5 YRS  06/21/2016     NIGHAT QUESTIONNAIRE 1 YEAR  03/30/2017     FALL RISK ASSESSMENT  08/14/2017     EYE EXAM Q1 YEAR  08/03/2018     CMP Q1 YR  09/07/2018     MICROALBUMIN Q1 YEAR  09/07/2018     MAMMO SCREEN Q2 YR (SYSTEM ASSIGNED)  09/13/2018     A1C Q6 MO  11/09/2018     TSH Q1 YEAR  02/15/2019     FOOT EXAM Q1 YEAR  05/07/2019     PHQ-2 Q1 YR  05/07/2019     LIPID MONITORING Q1 YEAR  05/09/2019     TETANUS IMMUNIZATION (SYSTEM ASSIGNED)  11/02/2019     TSH W/ FREE T4 REFLEX Q2 YEAR  02/15/2020     PNEUMO 5YR BOOST DUE AFTER AGE 65 (NO IB MSG) (2) 07/03/2020     PNEUMOCOCCAL (2 of 2 - PPSV23) 07/03/2020     COLON CANCER SCREEN (SYSTEM ASSIGNED)  04/10/2025     DEXA SCAN SCREENING (SYSTEM ASSIGNED)  Completed     INFLUENZA VACCINE  Completed     HEPATITIS C SCREENING  Completed     Labs reviewed in EPIC  BP Readings from Last 3 Encounters:   11/09/18 131/84   05/07/18 126/71   02/15/18 133/83    Wt Readings from Last 3 Encounters:   11/09/18 184 lb (83.5 kg)   05/07/18 185 lb (83.9 kg)   02/15/18 186 lb (84.4 kg)                  Patient Active Problem List   Diagnosis     Esophageal reflux     Postmenopausal atrophic vaginitis     Nonspecific reaction to tuberculin skin test without active tuberculosis     Anxiety state     Other osteoporosis     Hypothyroidism     Adenomatous polyp of colon     Hyperlipidemia LDL goal <130     Advanced directives, counseling/discussion     Type 2 diabetes mellitus with other specified complication, without long-term current use  of insulin (H)     Obstructive sleep apnea     Patellofemoral disorder of right knee     Rotator cuff syndrome     Pulmonary nodule     Obesity, Class II, BMI 35-39.9     Female stress incontinence     Plantar fasciitis     Bloating     Ventral hernia without obstruction or gangrene     Pelvic pain     Past Surgical History:   Procedure Laterality Date     C MAMMOGRAM, SCREENING  2003, 2002     C TOTAL ABDOM HYSTERECTOMY  2002    with BSO, for cervical dysplasia, no malignancy on biopsy     COLONOSCOPY  11/10/2010    COMBINED COLONOSCOPY, REMOVE TUMOR/POLYP/LESION BY SNARE performed by JOHNATHON BHATT at  GI, recheck in 3 years     COLONOSCOPY N/A 4/10/2015    Procedure: COMBINED COLONOSCOPY, SINGLE OR MULTIPLE BIOPSY/POLYPECTOMY BY BIOPSY;  Surgeon: Johnathon Bhatt MD;  Location:  GI     COMBINED ESOPHAGOSCOPY, GASTROSCOPY, DUODENOSCOPY (EGD) WITH CO2 INSUFFLATION Bilateral 6/22/2016    Procedure: COMBINED ESOPHAGOSCOPY, GASTROSCOPY, DUODENOSCOPY (EGD) WITH CO2 INSUFFLATION;  Surgeon: Duane, William Charles, MD;  Location: [facility] OR     CONIZATION CERVIX,KNIFE/LASER  2002    CIS     ESOPHAGOSCOPY, GASTROSCOPY, DUODENOSCOPY (EGD), COMBINED N/A 6/22/2016    Procedure: COMBINED ESOPHAGOSCOPY, GASTROSCOPY, DUODENOSCOPY (EGD), BIOPSY SINGLE OR MULTIPLE;  Surgeon: Duane, William Charles, MD;  Location: [facility] OR     HC COLONOSCOPY THRU STOMA W BIOPSY/CAUTERY TUMOR/POLYP/LESION  1/10/2006    adenomatous, recheck in 5 years      UGI ENDOSCOPY DIAG W BIOPSY  11/22/05     HC UGI ENDOSCOPY, SIMPLE EXAM  12/03    abnormal     HCL PAP SMEAR  2002     HYSTERECTOMY, PAP NO LONGER INDICATED       SLING TRANSVAGINAL N/A 3/31/2016    Procedure: SLING TRANSVAGINAL;  Surgeon: González Wright MD;  Location: [facility] OR       Social History   Substance Use Topics     Smoking status: Never Smoker     Smokeless tobacco: Never Used      Comment: no smokers in household     Alcohol use Yes      Comment: occ     Family History   Problem Relation  Age of Onset     Cancer No family hx of      breast or colon     Diabetes No family hx of      Hypertension No family hx of      HEART DISEASE No family hx of      CAD     Breast Cancer No family hx of      Depression No family hx of      Substance Abuse No family hx of      Anesthesia Reaction No family hx of      Genetic Disorder No family hx of          Current Outpatient Prescriptions   Medication Sig Dispense Refill     aspirin 81 MG EC tablet TAKE ONE TABLET BY MOUTH EVERY  tablet 2     ANGELA CONTOUR test strip TEST DAILY AS DIRECTED 50 each 3     ANGELA CONTOUR test strip TEST DAILY AS DIRECTED 50 each 3     ANGELA CONTOUR test strip TEST DAILY AS DIRECTED 50 each 3     blood glucose monitoring (ANGELA MICROLET) lancets TEST DAILY 100 each 5     blood glucose monitoring (ANGELA MICROLET) lancets TEST DAILY 100 each 5     blood glucose monitoring (ANGELA MICROLET) lancets TEST DAILY 100 each 5     blood glucose monitoring (NO BRAND SPECIFIED) meter device kit Use to test blood sugar one  times daily or as directed. 1 kit 0     blood glucose monitoring (NO BRAND SPECIFIED) test strip Use to test blood sugars one  times daily or as directed 90 each 3     Blood Pressure KIT Check blood pressures weekly 1 kit 0     Cholecalciferol (VITAMIN D3 PO) Take 600 Units by mouth daily       levothyroxine (SYNTHROID/LEVOTHROID) 75 MCG tablet TAKE ONE TABLET BY MOUTH EVERY MORNING 90 tablet 2     lovastatin (MEVACOR) 20 MG tablet Take 1 tablet (20 mg) by mouth At Bedtime 90 tablet 1     metFORMIN (GLUCOPHAGE) 500 MG tablet Take 1 tablet (500 mg) by mouth 2 times daily (with meals) 180 tablet 1     order for DME Equipment being ordered: super feet size c 1 Device 0     ranitidine (ZANTAC) 150 MG tablet Take 1 tablet (150 mg) by mouth 2 times daily 180 tablet 1     Allergies   Allergen Reactions     Sulfa Drugs      n&v       Pneumonia Vaccine:uptodate    Review of Systems   Constitutional: Positive for chills. Negative for  "fever.   HENT: Positive for hearing loss. Negative for congestion, ear pain and sore throat.    Eyes: Positive for visual disturbance. Negative for pain.   Respiratory: Positive for cough and shortness of breath.    Cardiovascular: Negative for chest pain and peripheral edema.   Gastrointestinal: Positive for diarrhea. Negative for abdominal pain, constipation, heartburn, hematochezia and nausea.   Endocrine: Negative.    Breasts:  Positive for breast mass. Negative for tenderness and discharge.   Genitourinary: Positive for frequency and urgency. Negative for dysuria, hematuria, pelvic pain, vaginal bleeding and vaginal discharge.   Musculoskeletal: Positive for arthralgias. Negative for joint swelling and myalgias.   Skin: Negative.  Negative for rash.   Allergic/Immunologic: Negative.    Neurological: Positive for weakness. Negative for headaches and paresthesias.   Hematological: Negative.    Psychiatric/Behavioral: Positive for mood changes. The patient is nervous/anxious.      Constitutional, HEENT, cardiovascular, pulmonary, GI, , musculoskeletal, neuro, skin, endocrine and psych systems are negative, except as otherwise noted.    OBJECTIVE:   There were no vitals taken for this visit. Estimated body mass index is 34.06 kg/(m^2) as calculated from the following:    Height as of 5/7/18: 5' 1.8\" (1.57 m).    Weight as of 5/7/18: 185 lb (83.9 kg).  Physical Exam   Constitutional: She is oriented to person, place, and time. She appears well-developed and well-nourished. No distress.   HENT:   Right Ear: Tympanic membrane and external ear normal.   Left Ear: Tympanic membrane and external ear normal.   Nose: Nose normal.   Mouth/Throat: Oropharynx is clear and moist. No oropharyngeal exudate.   Eyes: Conjunctivae are normal. Pupils are equal, round, and reactive to light. Right eye exhibits no discharge. Left eye exhibits no discharge.   Neck: Neck supple. No tracheal deviation present. No thyromegaly present. "   Cardiovascular: Normal rate, regular rhythm, S1 normal, S2 normal, normal heart sounds and normal pulses.  Exam reveals no S3, no S4 and no friction rub.    No murmur heard.  Pulmonary/Chest: Effort normal and breath sounds normal. No respiratory distress. She has no wheezes. She has no rales. Right breast exhibits no mass, no nipple discharge and no tenderness. Left breast exhibits no mass, no nipple discharge and no tenderness.   Abdominal: Soft. Bowel sounds are normal. She exhibits no mass. There is no hepatosplenomegaly. There is no tenderness.   Genitourinary: Vagina normal and uterus normal. Cervix exhibits no motion tenderness and no discharge. No vaginal discharge found.   Musculoskeletal: Normal range of motion. She exhibits no edema.   Lymphadenopathy:     She has no cervical adenopathy.   Neurological: She is alert and oriented to person, place, and time. She has normal strength and normal reflexes. She exhibits normal muscle tone.   Skin: Skin is warm and dry. No rash noted.   Psychiatric: She has a normal mood and affect. Judgment and thought content normal. Cognition and memory are normal.         Diagnostic Test Results:  none     ASSESSMENT / PLAN:     Problem List Items Addressed This Visit     Esophageal reflux    Relevant Medications    ranitidine (ZANTAC) 150 MG tablet    Hyperlipidemia LDL goal <130    Relevant Medications    lovastatin (MEVACOR) 20 MG tablet    Type 2 diabetes mellitus with other specified complication, without long-term current use of insulin (H) - Primary     Redo labs. Well controlled. Continue metformin ,statin, aspirin. Lisinopril not prescribed as normal kidney function         Relevant Medications    metFORMIN (GLUCOPHAGE) 500 MG tablet    Pulmonary nodule     Redo CT to follow up on lung nodules         Plantar fasciitis    Relevant Medications    order for DME      Other Visit Diagnoses     Visit for screening mammogram        Relevant Orders    MA SCREENING DIGITAL  "BILAT - Future  (s+30)    Screening for diabetic retinopathy        Cough        Relevant Orders    CT Chest w Contrast    Hearing loss, unspecified hearing loss type, unspecified laterality        Relevant Orders    AUDIOLOGY ADULT REFERRAL    Peripheral polyneuropathy        Symptomatic menopausal or female climacteric states        Relevant Orders    DX Hip/Pelvis/Spine    Encounter for routine adult health examination with abnormal findings                End of Life Planning:  Patient currently has an advanced directive: No.  I have verified the patient's ablity to prepare an advanced directive/make health care decisions.  Literature was provided to assist patient in preparing an advanced directive.    COUNSELING:  Reviewed preventive health counseling, as reflected in patient instructions       Regular exercise       Healthy diet/nutrition       Vision screening       Hearing screening    BP Readings from Last 1 Encounters:   05/07/18 126/71     Estimated body mass index is 34.06 kg/(m^2) as calculated from the following:    Height as of 5/7/18: 5' 1.8\" (1.57 m).    Weight as of 5/7/18: 185 lb (83.9 kg).           reports that she has never smoked. She has never used smokeless tobacco.      Appropriate preventive services were discussed with this patient, including applicable screening as appropriate for cardiovascular disease, diabetes, osteopenia/osteoporosis, and glaucoma.  As appropriate for age/gender, discussed screening for colorectal cancer, prostate cancer, breast cancer, and cervical cancer. Checklist reviewing preventive services available has been given to the patient.    Reviewed patients plan of care and provided an AVS. The Basic Care Plan (routine screening as documented in Health Maintenance) for Vero meets the Care Plan requirement. This Care Plan has been established and reviewed with the Patient.    Counseling Resources:  ATP IV Guidelines  Pooled Cohorts Equation Calculator  Breast Cancer " Risk Calculator  FRAX Risk Assessment  ICSI Preventive Guidelines  Dietary Guidelines for Americans, 2010  USDA's MyPlate  ASA Prophylaxis  Lung CA Screening    Marie Warner MD  [facility]

## 2018-11-09 ENCOUNTER — OFFICE VISIT (OUTPATIENT)
Dept: FAMILY MEDICINE | Facility: OTHER | Age: 68
End: 2018-11-09
Payer: COMMERCIAL

## 2018-11-09 VITALS
BODY MASS INDEX: 33.86 KG/M2 | HEART RATE: 67 BPM | RESPIRATION RATE: 16 BRPM | SYSTOLIC BLOOD PRESSURE: 131 MMHG | OXYGEN SATURATION: 100 % | WEIGHT: 184 LBS | DIASTOLIC BLOOD PRESSURE: 84 MMHG | TEMPERATURE: 98 F | HEIGHT: 62 IN

## 2018-11-09 DIAGNOSIS — R05.9 COUGH: ICD-10-CM

## 2018-11-09 DIAGNOSIS — Z00.01 ENCOUNTER FOR ROUTINE ADULT HEALTH EXAMINATION WITH ABNORMAL FINDINGS: ICD-10-CM

## 2018-11-09 DIAGNOSIS — Z13.5 SCREENING FOR DIABETIC RETINOPATHY: ICD-10-CM

## 2018-11-09 DIAGNOSIS — R91.1 PULMONARY NODULE: ICD-10-CM

## 2018-11-09 DIAGNOSIS — G62.9 PERIPHERAL POLYNEUROPATHY: ICD-10-CM

## 2018-11-09 DIAGNOSIS — M72.2 PLANTAR FASCIITIS: ICD-10-CM

## 2018-11-09 DIAGNOSIS — Z12.31 VISIT FOR SCREENING MAMMOGRAM: ICD-10-CM

## 2018-11-09 DIAGNOSIS — H91.90 HEARING LOSS, UNSPECIFIED HEARING LOSS TYPE, UNSPECIFIED LATERALITY: ICD-10-CM

## 2018-11-09 DIAGNOSIS — E11.69 TYPE 2 DIABETES MELLITUS WITH OTHER SPECIFIED COMPLICATION, WITHOUT LONG-TERM CURRENT USE OF INSULIN (H): ICD-10-CM

## 2018-11-09 DIAGNOSIS — E78.5 HYPERLIPIDEMIA LDL GOAL <130: ICD-10-CM

## 2018-11-09 DIAGNOSIS — K21.9 GASTROESOPHAGEAL REFLUX DISEASE, ESOPHAGITIS PRESENCE NOT SPECIFIED: ICD-10-CM

## 2018-11-09 DIAGNOSIS — E11.9 TYPE 2 DIABETES MELLITUS WITHOUT COMPLICATION, WITHOUT LONG-TERM CURRENT USE OF INSULIN (H): Primary | ICD-10-CM

## 2018-11-09 DIAGNOSIS — N95.1 SYMPTOMATIC MENOPAUSAL OR FEMALE CLIMACTERIC STATES: ICD-10-CM

## 2018-11-09 LAB
ALBUMIN SERPL-MCNC: 3.8 G/DL (ref 3.4–5)
ALP SERPL-CCNC: 75 U/L (ref 40–150)
ALT SERPL W P-5'-P-CCNC: 43 U/L (ref 0–50)
ANION GAP SERPL CALCULATED.3IONS-SCNC: 10 MMOL/L (ref 3–14)
AST SERPL W P-5'-P-CCNC: 26 U/L (ref 0–45)
BILIRUB SERPL-MCNC: 0.8 MG/DL (ref 0.2–1.3)
BUN SERPL-MCNC: 17 MG/DL (ref 7–30)
CALCIUM SERPL-MCNC: 8.7 MG/DL (ref 8.5–10.1)
CHLORIDE SERPL-SCNC: 109 MMOL/L (ref 94–109)
CO2 SERPL-SCNC: 24 MMOL/L (ref 20–32)
CREAT SERPL-MCNC: 0.64 MG/DL (ref 0.52–1.04)
CREAT UR-MCNC: 81 MG/DL
GFR SERPL CREATININE-BSD FRML MDRD: >90 ML/MIN/1.7M2
GLUCOSE SERPL-MCNC: 96 MG/DL (ref 70–99)
HBA1C MFR BLD: 6 % (ref 0–5.6)
MICROALBUMIN UR-MCNC: 6 MG/L
MICROALBUMIN/CREAT UR: 7.6 MG/G CR (ref 0–25)
POTASSIUM SERPL-SCNC: 4 MMOL/L (ref 3.4–5.3)
PROT SERPL-MCNC: 7.8 G/DL (ref 6.8–8.8)
SODIUM SERPL-SCNC: 143 MMOL/L (ref 133–144)

## 2018-11-09 PROCEDURE — 36415 COLL VENOUS BLD VENIPUNCTURE: CPT | Performed by: FAMILY MEDICINE

## 2018-11-09 PROCEDURE — 99397 PER PM REEVAL EST PAT 65+ YR: CPT | Performed by: FAMILY MEDICINE

## 2018-11-09 PROCEDURE — 83036 HEMOGLOBIN GLYCOSYLATED A1C: CPT | Performed by: FAMILY MEDICINE

## 2018-11-09 PROCEDURE — 80053 COMPREHEN METABOLIC PANEL: CPT | Performed by: FAMILY MEDICINE

## 2018-11-09 PROCEDURE — 82043 UR ALBUMIN QUANTITATIVE: CPT | Performed by: FAMILY MEDICINE

## 2018-11-09 RX ORDER — LOVASTATIN 20 MG
20 TABLET ORAL AT BEDTIME
Qty: 90 TABLET | Refills: 1 | Status: SHIPPED | OUTPATIENT
Start: 2018-11-09

## 2018-11-09 ASSESSMENT — ENCOUNTER SYMPTOMS
HEARTBURN: 0
SORE THROAT: 0
COUGH: 1
ALLERGIC/IMMUNOLOGIC NEGATIVE: 1
ENDOCRINE NEGATIVE: 1
EYE PAIN: 0
SHORTNESS OF BREATH: 1
HEADACHES: 0
HEMATOLOGIC/LYMPHATIC NEGATIVE: 1
FEVER: 0
DIARRHEA: 1
HEMATOCHEZIA: 0
ABDOMINAL PAIN: 0
CHILLS: 1
DYSURIA: 0
MYALGIAS: 0
PARESTHESIAS: 0
CONSTIPATION: 0
NERVOUS/ANXIOUS: 1
HEMATURIA: 0
JOINT SWELLING: 0
WEAKNESS: 1
ARTHRALGIAS: 1
FREQUENCY: 1
BREAST MASS: 1
NAUSEA: 0

## 2018-11-09 ASSESSMENT — PAIN SCALES - GENERAL: PAINLEVEL: NO PAIN (0)

## 2018-11-09 ASSESSMENT — ACTIVITIES OF DAILY LIVING (ADL): CURRENT_FUNCTION: NO ASSISTANCE NEEDED

## 2018-11-09 NOTE — MR AVS SNAPSHOT
After Visit Summary   11/9/2018    Vero Scott    MRN: [id number]           Patient Information     Date Of Birth          1950        Visit Information        Provider Department      11/9/2018 10:45 AM Marie Warner MD; PHONE,  [facility]        Today's Diagnoses     Cough    -  1    Visit for screening mammogram        Screening for diabetic retinopathy        Type 2 diabetes mellitus without complication, without long-term current use of insulin (H)        Hyperlipidemia LDL goal <130        Gastroesophageal reflux disease, esophagitis presence not specified        Type 2 diabetes mellitus with other specified complication, without long-term current use of insulin (H)        Pulmonary nodule        Hearing loss, unspecified hearing loss type, unspecified laterality        Peripheral polyneuropathy        Plantar fasciitis           Follow-ups after your visit        Additional Services     AUDIOLOGY ADULT REFERRAL       Your provider has referred you to: [facility]: [facility] [phone]   [email or web address]    Specialty Testing:  Audiogram w/Tymps and Reflexes (Comprehensive Audiology Evaluation)                  Your next 10 appointments already scheduled     Nov 14, 2018 11:30 AM CST   CT CHEST W CONTRAST with MGCT1   [facility] ([facility])    [id number] [place] [address] [phone]           How do I prepare for my exam? (Food and drink instructions) **You will have contrast for this exam.** Do not eat or drink for 2 hours before your exam. If you need to take medicine, you may take it with small sips of water. (We may ask you to take liquid medicine as well.)  The day before your exam, drink extra fluids at least six 8-ounce glasses (unless your doctor tells you to restrict your fluids).  How do I prepare for my exam? (Other instructions) Patients over  70 or patients with diabetes or kidney problems: If you haven t had a blood test (creatinine test) within the last 30 days, the Cardiologist/Radiologist may require you to get this test prior to your exam.  What should I wear: Please wear loose clothing, such as a sweat suit or jogging clothes.  Avoid snaps, zippers and other metal. We may ask you to undress and put on a hospital gown.  How long does the exam take: Most scans take less than 20 minutes.  What should I bring: Please bring any scans or X-rays taken at other hospitals, if similar tests were done. Also bring a list of your medicines, including vitamins, minerals and over-the-counter drugs. It is safest to leave personal items at home.  Do I need a :  No  is needed.  What do I need to tell my doctor? Be sure to tell your doctor: * If you have any allergies. * If there s any chance you are pregnant. * If you are breastfeeding. * If you have diabetes as your medication may need to be adjusted for this exam.  What should I do after the exam: No restrictions, You may resume normal activities.  What is this test: A CT (computed tomography) scan is a series of pictures that allows us to look inside your body. The scanner creates images of the body in cross sections, much like slices of bread. This helps us see any problems more clearly. You may receive contrast (X-ray dye) before or during your scan. Contrast is given through an IV (small needle in your arm).  Who should I call with questions: If you have any questions, please call the Imaging Department where you will have your exam. Directions, parking instructions, and other information is available on our website, [email or web address].            Nov 19, 2018  2:30 PM CST   [facility] with Rocio Cohen   [facility]    [address] [place] [address] [address]  [facility] [address]   [phone]            Nov 21, 2018  1:45 PM CST   MA SCREENING DIGITAL  "BILATERAL with ERMA1   [facility]    [address] [facility] [address] [phone]           How do I prepare for my exam? (Food and drink instructions) No Food and Drink Restrictions.  How do I prepare for my exam? (Other instructions) Do not use any powder, lotion or deodorant under your arms or on your breast. If you do, we will ask you to remove it before your exam.  What should I wear: Wear comfortable, two-piece clothing.  How long does the exam take: Most scans will take 15 minutes.  What should I bring: Bring any previous mammograms from other facilities or have them mailed to the breast center.  Do I need a :  No  is needed.  What do I need to tell my doctor: If you have any allergies, tell your care team.  What should I do after the exam: No restrictions, You may resume normal activities.  What is this test: This test is an x-ray of the breast to look for breast disease. The breast is pressed between two plates to flatten and spread the tissue. An X-ray is taken of the breast from different angles.  Who should I call with questions: If you have any questions, please call the Imaging Department where you will have your exam. Directions, parking instructions, and other information is available on our website, [email or web address].  Other information about my exam Three-dimensional (3D) mammograms are available at [facility] locations in [facility], [place], [place], [facility], [place], [facility] and [place].  Health locations include [place] and the [facility]ics [facility] Center in [place].  Benefits of 3D mammograms include * Improved rate of cancer detection * Decreases your chance of having to go back for more tests, which means fewer: * \"False-positive\" results (This means that there is an abnormal area but it isn't cancer.) * Invasive testing procedures, such as a biopsy or surgery * Can provide clearer images " of the breast if you have dense breast tissue.  *3D mammography is an optional exam that anyone can have with a 2D mammogram. It doesn't replace or take the place of a 2D mammogram. 2D mammograms remain an effective screening test for all women.  Not all insurance companies cover the cost of a 3D mammogram. Check with your insurance.              Future tests that were ordered for you today     Open Future Orders        Priority Expected Expires Ordered    MA SCREENING DIGITAL BILAT - Future  (s+30) Routine  11/5/2019 11/9/2018    CT Chest w Contrast Routine  11/9/2019 11/9/2018            Who to contact     If you have questions or need follow up information about today's clinic visit or your schedule please contact [facility] directly at [phone].  Normal or non-critical lab and imaging results will be communicated to you by MyChart, letter or phone within 4 business days after the clinic has received the results. If you do not hear from us within 7 days, please contact the clinic through [employer] or phone. If you have a critical or abnormal lab result, we will notify you by phone as soon as possible.  Submit refill requests through [employer] or call your pharmacy and they will forward the refill request to us. Please allow 3 business days for your refill to be completed.          Additional Information About Your Visit        [employer] Information     [employer] gives you secure access to your electronic health record. If you see a primary care provider, you can also send messages to your care team and make appointments. If you have questions, please call your primary care clinic.  If you do not have a primary care provider, please call [phone] and they will assist you.        Care EveryWhere ID     This is your Care EveryWhere ID. This could be used by other organizations to access your [place] medical records  [id number]        Your Vitals Were     Pulse Temperature Respirations Height Pulse  "Oximetry BMI (Body Mass Index)    67 98  F (36.7  C) (Temporal) 16 5' 1.8\" (1.57 m) 100% 33.87 kg/m2       Blood Pressure from Last 3 Encounters:   11/09/18 131/84   05/07/18 126/71   02/15/18 133/83    Weight from Last 3 Encounters:   11/09/18 184 lb (83.5 kg)   05/07/18 185 lb (83.9 kg)   02/15/18 186 lb (84.4 kg)              We Performed the Following     Albumin Random Urine Quantitative with Creat Ratio     AUDIOLOGY ADULT REFERRAL     COMPREHENSIVE METABOLIC PANEL     HEMOGLOBIN A1C          Today's Medication Changes          These changes are accurate as of 11/9/18 12:49 PM.  If you have any questions, ask your nurse or doctor.               These medicines have changed or have updated prescriptions.        Dose/Directions    metFORMIN 500 MG tablet   Commonly known as:  GLUCOPHAGE   This may have changed:  See the new instructions.   Used for:  Type 2 diabetes mellitus without complication, without long-term current use of insulin (H)   Changed by:  Marie Warner MD        Dose:  500 mg   Take 1 tablet (500 mg) by mouth 2 times daily (with meals)   Quantity:  180 tablet   Refills:  1       ranitidine 150 MG tablet   Commonly known as:  ZANTAC   This may have changed:  See the new instructions.   Used for:  Gastroesophageal reflux disease, esophagitis presence not specified   Changed by:  Marie Warner MD        Dose:  150 mg   Take 1 tablet (150 mg) by mouth 2 times daily   Quantity:  180 tablet   Refills:  1         Stop taking these medicines if you haven't already. Please contact your care team if you have questions.     calcitonin (salmon) 200 UNIT/ACT nasal spray   Commonly known as:  MIACALCIN   Stopped by:  Marie Warner MD           omeprazole 20 MG CR capsule   Commonly known as:  priLOSEC   Stopped by:  Marie Warner MD                Where to get your medicines      These medications were sent to [place], [place] - [address]  [address] [facility] [place] " [facility] [address]     Phone:  [phone]     lovastatin 20 MG tablet    metFORMIN 500 MG tablet    ranitidine 150 MG tablet         Some of these will need a paper prescription and others can be bought over the counter.  Ask your nurse if you have questions.     Bring a paper prescription for each of these medications     order for DME                Primary Care Provider Office Phone # Fax #    Marie Warner [phone]       [address] [facility] [address]  [facility] [address]        Equal Access to Services     SHANNAN ALEJANDRO : Hadii aad ku hadasho Soomaali, waaxda luqadaha, qaybta kaalmada adeegyada, waxay kennyin hayaan abraham wilcox . So [facility] [phone].    ATENCIÓN: Si habla español, tiene a berman disposición servicios gratuitos de asistencia lingüística. [facility] [phone].    We comply with applicable federal civil rights laws and [place] laws. We do not discriminate on the basis of race, color, national origin, age, disability, sex, sexual orientation, or gender identity.            Thank you!     Thank you for choosing [facility]  for your care. Our goal is always to provide you with excellent care. Hearing back from our patients is one way we can continue to improve our services. Please take a few minutes to complete the written survey that you may receive in the mail after your visit with us. Thank you!             Your Updated Medication List - Protect others around you: Learn how to safely use, store and throw away your medicines at www.disposemymeds.org.          This list is accurate as of 11/9/18 12:49 PM.  Always use your most recent med list.                   Brand Name Dispense Instructions for use Diagnosis    aspirin 81 MG EC tablet     120 tablet    TAKE ONE TABLET BY MOUTH EVERY DAY    Hyperlipidemia LDL goal <130       * blood glucose monitoring lancets     100 each    TEST DAILY    Osteoporosis, Impaired fasting glucose       * blood glucose monitoring  lancets     100 each    TEST DAILY    Osteoporosis, Impaired fasting glucose       * blood glucose monitoring lancets     100 each    TEST DAILY    Osteoporosis, Impaired fasting glucose       blood glucose monitoring meter device kit    no brand specified    1 kit    Use to test blood sugar one  times daily or as directed.    Type 2 diabetes mellitus without complication (H)       * blood glucose monitoring test strip    no brand specified    90 each    Use to test blood sugars one  times daily or as directed    Type 2 diabetes mellitus without complication (H)       * ANGELA CONTOUR test strip   Generic drug:  blood glucose monitoring     50 each    TEST DAILY AS DIRECTED    Impaired fasting glucose       * ANGELA CONTOUR test strip   Generic drug:  blood glucose monitoring     50 each    TEST DAILY AS DIRECTED    Impaired fasting glucose       * ANGELA CONTOUR test strip   Generic drug:  blood glucose monitoring     50 each    TEST DAILY AS DIRECTED    Impaired fasting glucose       Blood Pressure Kit     1 kit    Check blood pressures weekly    Benign essential hypertension       levothyroxine 75 MCG tablet    SYNTHROID/LEVOTHROID    90 tablet    TAKE ONE TABLET BY MOUTH EVERY MORNING    Hypothyroidism, unspecified type       lovastatin 20 MG tablet    MEVACOR    90 tablet    Take 1 tablet (20 mg) by mouth At Bedtime    Hyperlipidemia LDL goal <130       metFORMIN 500 MG tablet    GLUCOPHAGE    180 tablet    Take 1 tablet (500 mg) by mouth 2 times daily (with meals)    Type 2 diabetes mellitus without complication, without long-term current use of insulin (H)       order for DME     1 Device    Equipment being ordered: super feet size c    Plantar fasciitis       ranitidine 150 MG tablet    ZANTAC    180 tablet    Take 1 tablet (150 mg) by mouth 2 times daily    Gastroesophageal reflux disease, esophagitis presence not specified       VITAMIN D3 PO      Take 600 Units by mouth daily        * Notice:  This list has  7 medication(s) that are the same as other medications prescribed for you. Read the directions carefully, and ask your doctor or other care provider to review them with you.

## 2018-11-12 NOTE — ASSESSMENT & PLAN NOTE
Redo labs. Well controlled. Continue metformin ,statin, aspirin. Lisinopril not prescribed as normal kidney function

## 2018-11-14 ENCOUNTER — RADIANT APPOINTMENT (OUTPATIENT)
Dept: CT IMAGING | Facility: CLINIC | Age: 68
End: 2018-11-14
Attending: FAMILY MEDICINE
Payer: COMMERCIAL

## 2018-11-14 DIAGNOSIS — R05.9 COUGH: ICD-10-CM

## 2018-11-14 PROCEDURE — 71250 CT THORAX DX C-: CPT | Performed by: RADIOLOGY

## 2018-11-15 ENCOUNTER — TELEPHONE (OUTPATIENT)
Dept: FAMILY MEDICINE | Facility: OTHER | Age: 68
End: 2018-11-15

## 2018-11-15 NOTE — LETTER
[address]  [facility] [address]  [phone]        November 15, 2018    Vero Scott  [address]  [facility] [address]              Dear Vero Warner did order your Dexa Scan. We do not do these at [facility]  But you could go to [facility] in [place] [phone] to schedule or if you would  Like to go to [place] [phone] to schedule this. If you have any questions  Please let us know.    Thank you,  [place] Care Team

## 2018-11-15 NOTE — TELEPHONE ENCOUNTER
----- Message from Marie Warner MD sent at 11/15/2018 10:32 AM CST -----  No changes noted on her lung CT. No intervention necessary at this point . We will continue to  Monitor based on her symptoms.

## 2018-11-15 NOTE — TELEPHONE ENCOUNTER
Pt returned the call and was read the result.  She states that dr monaco forgot to order the bone density scan.  Please place order and call pt when placed.  thanks

## 2018-11-19 ENCOUNTER — OFFICE VISIT (OUTPATIENT)
Dept: AUDIOLOGY | Facility: OTHER | Age: 68
End: 2018-11-19
Payer: COMMERCIAL

## 2018-11-19 DIAGNOSIS — H90.12 CONDUCTIVE HEARING LOSS OF LEFT EAR WITH UNRESTRICTED HEARING OF RIGHT EAR: Primary | ICD-10-CM

## 2018-11-19 PROCEDURE — 99207 ZZC NO CHARGE LOS: CPT | Performed by: AUDIOLOGIST

## 2018-11-19 PROCEDURE — 92567 TYMPANOMETRY: CPT | Performed by: AUDIOLOGIST

## 2018-11-19 PROCEDURE — 92557 COMPREHENSIVE HEARING TEST: CPT | Performed by: AUDIOLOGIST

## 2018-11-19 NOTE — MR AVS SNAPSHOT
After Visit Summary   11/19/2018    Vero Scott    MRN: [id number]           Patient Information     Date Of Birth          1950        Visit Information        Provider Department      11/19/2018 2:15 PM Nikko Jeong AuD; PHONE,  [facility]        Today's Diagnoses     Conductive hearing loss of left ear with unrestricted hearing of right ear    -  1       Follow-ups after your visit        Your next 10 appointments already scheduled     Nov 21, 2018  1:30 PM CST   MA SCREENING DIGITAL BILATERAL with ERMA1   [facility]    [address] [address]   [phone]           How do I prepare for my exam? (Food and drink instructions) No Food and Drink Restrictions.  How do I prepare for my exam? (Other instructions) Do not use any powder, lotion or deodorant under your arms or on your breast. If you do, we will ask you to remove it before your exam.  What should I wear: Wear comfortable, two-piece clothing.  How long does the exam take: Most scans will take 15 minutes.  What should I bring: Bring any previous mammograms from other facilities or have them mailed to the breast center.  Do I need a :  No  is needed.  What do I need to tell my doctor: If you have any allergies, tell your care team.  What should I do after the exam: No restrictions, You may resume normal activities.  What is this test: This test is an x-ray of the breast to look for breast disease. The breast is pressed between two plates to flatten and spread the tissue. An X-ray is taken of the breast from different angles.  Who should I call with questions: If you have any questions, please call the Imaging Department where you will have your exam. Directions, parking instructions, and other information is available on our website, [email or web address].  Other information about my exam Three-dimensional (3D) mammograms are  "available at [facility] locations in [place], [place], [place], [place], [facility], [place], [facility] and [place]. [facility] locations include [place] and the Clinics and [facility] Center in [place].  Benefits of 3D mammograms include * Improved rate of cancer detection * Decreases your chance of having to go back for more tests, which means fewer: * \"False-positive\" results (This means that there is an abnormal area but it isn't cancer.) * Invasive testing procedures, such as a biopsy or surgery * Can provide clearer images of the breast if you have dense breast tissue.  *3D mammography is an optional exam that anyone can have with a 2D mammogram. It doesn't replace or take the place of a 2D mammogram. 2D mammograms remain an effective screening test for all women.  Not all insurance companies cover the cost of a 3D mammogram. Check with your insurance.            Nov 28, 2018  1:00 PM CST   DX HIP/PELVIS/SPINE with MGDX1   [facility] ([facility]    [address] [place] [address] [phone]           How do I prepare for my exam? (Food and drink instructions) No Food and Drink Restrictions.  How do I prepare for my exam? (Other instructions) Please do not take any of the following 24 hours prior to the day of your exam: vitamins, calcium tablets, antacids.  What should I wear: If possible, please wear clothes without metal (snaps, zippers). A sweat suit works well.  How long does the exam take: The exam takes about 20 minutes.  What should I bring: Bring a list of your current medicines to your exam (including vitamins, minerals and over-the-counter drugs).  Do I need a :  No  is needed.  What should I do after the exam: No restrictions, You may resume normal activities.  How do I prepare for my exam? (Food and drink instructions) A DEXA scan is a bone-density scan. It uses a low level of radiation to check the strength of your " bones. As you lie on a padded table, a machine will take X-rays. We most often scan the hips and lower spine.  Who should I call with questions: If you have any questions, please call the Imaging Department where you will have your exam. Directions, parking instructions, and other information is available on our website, [email or web address].            Jan 09, 2019  3:15 PM CST   [facility] Visit with [facility]on [facility]   [facility]    [address]  [facility] [address] [phone]              Who to contact     If you have questions or need follow up information about today's clinic visit or your schedule please contact [facility] directly at [phone].  Normal or non-critical lab and imaging results will be communicated to you by MyChart, letter or phone within 4 business days after the clinic has received the results. If you do not hear from us within 7 days, please contact the clinic through MyChart or phone. If you have a critical or abnormal lab result, we will notify you by phone as soon as possible.  Submit refill requests through [employer] or call your pharmacy and they will forward the refill request to us. Please allow 3 business days for your refill to be completed.          Additional Information About Your Visit        [employer] Information     [employer] gives you secure access to your electronic health record. If you see a primary care provider, you can also send messages to your care team and make appointments. If you have questions, please call your primary care clinic.  If you do not have a primary care provider, please call [phone] and they will assist you.        Care EveryWhere ID     This is your Care EveryWhere ID. This could be used by other organizations to access your [facility] medical records  [id number]         Blood Pressure from Last 3 Encounters:   11/09/18 131/84   05/07/18 126/71   02/15/18 133/83     Weight from Last 3 Encounters:   11/09/18 184 lb (83.5 kg)   05/07/18 185 lb (83.9 kg)   02/15/18 186 lb (84.4 kg)              We Performed the Following     AUDIOGRAM/TYMPANOGRAM - INTERFACE     COMPREHENSIVE HEARING TEST     TYMPANOMETRY        Primary Care Provider Office Phone # Fax #    Marie Warner [phone]       [address]  [facility] [address]        Equal Access to Services     SHANNAN ALEJANDRO : Hadii aad ku hadasho Soomaali, waaxda luqadaha, qaybta kaalmada adeegyada, waxay idiin hayaan adeeg catrachitaarakenny la'robin . So [facility] [phone].    ATENCIÓN: Si habla español, tiene a berman disposición servicios gratuitos de asistencia lingüística. [facility] [phone].    We comply with applicable federal civil rights laws and [place] laws. We do not discriminate on the basis of race, color, national origin, age, disability, sex, sexual orientation, or gender identity.            Thank you!     Thank you for choosing [facility]  for your care. Our goal is always to provide you with excellent care. Hearing back from our patients is one way we can continue to improve our services. Please take a few minutes to complete the written survey that you may receive in the mail after your visit with us. Thank you!             Your Updated Medication List - Protect others around you: Learn how to safely use, store and throw away your medicines at www.disposemymeds.org.          This list is accurate as of 11/19/18  3:20 PM.  Always use your most recent med list.                   Brand Name Dispense Instructions for use Diagnosis    aspirin 81 MG EC tablet     120 tablet    TAKE ONE TABLET BY MOUTH EVERY DAY    Hyperlipidemia LDL goal <130       * blood glucose monitoring lancets     100 each    TEST DAILY    Osteoporosis, Impaired fasting glucose       * blood glucose monitoring lancets     100 each    TEST DAILY    Osteoporosis, Impaired fasting glucose       * blood glucose  monitoring lancets     100 each    TEST DAILY    Osteoporosis, Impaired fasting glucose       blood glucose monitoring meter device kit    no brand specified    1 kit    Use to test blood sugar one  times daily or as directed.    Type 2 diabetes mellitus without complication (H)       * blood glucose monitoring test strip    no brand specified    90 each    Use to test blood sugars one  times daily or as directed    Type 2 diabetes mellitus without complication (H)       * ANGELA CONTOUR test strip   Generic drug:  blood glucose monitoring     50 each    TEST DAILY AS DIRECTED    Impaired fasting glucose       * ANGELA CONTOUR test strip   Generic drug:  blood glucose monitoring     50 each    TEST DAILY AS DIRECTED    Impaired fasting glucose       * ANGELA CONTOUR test strip   Generic drug:  blood glucose monitoring     50 each    TEST DAILY AS DIRECTED    Impaired fasting glucose       Blood Pressure Kit     1 kit    Check blood pressures weekly    Benign essential hypertension       levothyroxine 75 MCG tablet    SYNTHROID/LEVOTHROID    90 tablet    TAKE ONE TABLET BY MOUTH EVERY MORNING    Hypothyroidism, unspecified type       lovastatin 20 MG tablet    MEVACOR    90 tablet    Take 1 tablet (20 mg) by mouth At Bedtime    Hyperlipidemia LDL goal <130       metFORMIN 500 MG tablet    GLUCOPHAGE    180 tablet    Take 1 tablet (500 mg) by mouth 2 times daily (with meals)    Type 2 diabetes mellitus without complication, without long-term current use of insulin (H)       order for DME     1 Device    Equipment being ordered: super feet size c    Plantar fasciitis       ranitidine 150 MG tablet    ZANTAC    180 tablet    Take 1 tablet (150 mg) by mouth 2 times daily    Gastroesophageal reflux disease, esophagitis presence not specified       VITAMIN D3 PO      Take 600 Units by mouth daily        * Notice:  This list has 7 medication(s) that are the same as other medications prescribed for you. Read the directions  carefully, and ask your doctor or other care provider to review them with you.

## 2018-11-19 NOTE — PROGRESS NOTES
AUDIOLOGY REPORT    SUBJECTIVE:  Vero Scott is a 68 year old female who was seen in the Audiology Clinic at [facility] on 11/19/18 for audiologic evaluation, referred by Marie Shipman MD.The patient reports some hearing problems in the left ear. The patient denies  bilateral tinnitus, bilateral otalgia, bilateral drainage and bilateral aural fullness. The patient notes difficulty with communication in a variety of listening situations. They were accompanied today by her daughter, who acted as , as patient does not speak English    OBJECTIVE:    Otoscopic exam indicates right ear is clear of cerumen, the left ear revealed a probable perforation.     Pure Tone Thresholds assessed using standard techniques  audiometry with good  reliability from 250-8000 Hz bilaterally using circumaural headphones     RIGHT:  normal, hearing from 250-4000 Hz, with mild sensorineural hearing loss, consistent with presbycusis.    LEFT:    mild to moderately severe conductive hearing loss    Tympanogram:    RIGHT: normal eardrum mobility    LEFT:   Could not seal; consistent with tympanic membrane perforation      Speech Detection Threshold:    RIGHT: 20 dB HL    LEFT:   35 dB HL    Word Recognition Score:     Could not test, as patient will not speak English      ASSESSMENT:   Uniltaral conductive hearing loss on left.    Today s results were discussed with the patient in detail.     PLAN:   It is recommended that the patient schedule an appointment with Otolaryngology for the left ear conductive hearing loss with possible eardrum perforation.  Please call this clinic with questions regarding these results or recommendations.      Russell Jeong MA, CCC-A  [place] Licensed Audiologist #[id number]  11/19/2018    CC:  Marie Shipman MD

## 2018-11-21 ENCOUNTER — RADIANT APPOINTMENT (OUTPATIENT)
Dept: MAMMOGRAPHY | Facility: OTHER | Age: 68
End: 2018-11-21
Attending: FAMILY MEDICINE
Payer: COMMERCIAL

## 2018-11-21 DIAGNOSIS — Z12.31 VISIT FOR SCREENING MAMMOGRAM: ICD-10-CM

## 2018-11-21 PROCEDURE — 77067 SCR MAMMO BI INCL CAD: CPT | Mod: TC

## 2018-11-23 ENCOUNTER — TELEPHONE (OUTPATIENT)
Dept: FAMILY MEDICINE | Facility: OTHER | Age: 68
End: 2018-11-23

## 2018-11-28 ENCOUNTER — RADIANT APPOINTMENT (OUTPATIENT)
Dept: BONE DENSITY | Facility: CLINIC | Age: 68
End: 2018-11-28
Attending: FAMILY MEDICINE
Payer: COMMERCIAL

## 2018-11-28 DIAGNOSIS — N95.1 SYMPTOMATIC MENOPAUSAL OR FEMALE CLIMACTERIC STATES: ICD-10-CM

## 2018-11-28 PROCEDURE — 77080 DXA BONE DENSITY AXIAL: CPT | Performed by: RADIOLOGY

## 2018-12-02 DIAGNOSIS — E78.5 HYPERLIPIDEMIA LDL GOAL <130: ICD-10-CM

## 2018-12-02 DIAGNOSIS — E11.9 TYPE 2 DIABETES MELLITUS WITHOUT COMPLICATION, WITHOUT LONG-TERM CURRENT USE OF INSULIN (H): ICD-10-CM

## 2018-12-02 DIAGNOSIS — R73.01 IMPAIRED FASTING GLUCOSE: ICD-10-CM

## 2018-12-04 RX ORDER — LOVASTATIN 20 MG
TABLET ORAL
Qty: 30 TABLET | Refills: 0 | OUTPATIENT
Start: 2018-12-04

## 2018-12-04 NOTE — TELEPHONE ENCOUNTER
"Requested Prescriptions   Pending Prescriptions Disp Refills     lovastatin (MEVACOR) 20 MG tablet [Pharmacy Med Name: LOVASTATIN 20MG TABS] 30 tablet 0     Sig: TAKE 1 TABLET (20 MG) BY MOUTH AT BEDTIME    Statins Protocol Passed    12/2/2018  9:17 PM       Passed - LDL on file in past 12 months    Recent Labs   Lab Test  05/09/18   0826   LDL  60          Passed - No abnormal creatine kinase in past 12 months    No lab results found.        Passed - Recent (12 mo) or future (30 days) visit within the authorizing provider's specialty    Patient had office visit in the last 12 months or has a visit in the next 30 days with authorizing provider or within the authorizing provider's specialty.  See \"Patient Info\" tab in inbasket, or \"Choose Columns\" in Meds & Orders section of the refill encounter.         Passed - Patient is age 18 or older       Passed - No active pregnancy on record       Passed - No positive pregnancy test in past 12 months        ANGELA CONTOUR test strip [Pharmacy Med Name: ANGELA CONTOUR TEST  STRP] 50 each 3     Sig: TEST DAILY AS DIRECTED    Diabetic Supplies Protocol Passed    12/2/2018  9:17 PM       Passed - Patient is 18 years of age or older       Passed - Recent (6 mo) or future (30 days) visit within the authorizing provider's specialty    Patient had office visit in the last 6 months or has a visit in the next 30 days with authorizing provider.  See \"Patient Info\" tab in inbasket, or \"Choose Columns\" in Meds & Orders section of the refill encounter.            metFORMIN (GLUCOPHAGE) 500 MG tablet [Pharmacy Med Name: METFORMIN HCL 500MG TABS] 60 tablet 0     Sig: TAKE ONE TABLET BY MOUTH TWICE A DAY WITH MEALS    Biguanide Agents Passed    12/2/2018  9:17 PM       Passed - Blood pressure less than 140/90 in past 6 months    BP Readings from Last 3 Encounters:   11/09/18 131/84   05/07/18 126/71   02/15/18 133/83          Passed - Patient has documented LDL within the past 12 mos.    " "Recent Labs   Lab Test  05/09/18   0826   LDL  60          Passed - Patient has had a Microalbumin in the past 15 mos.    Recent Labs   Lab Test  11/09/18   1259   MICROL  6   UMALCR  7.60          Passed - Patient is age 10 or older       Passed - Patient has documented A1c within the specified period of time.    If HgbA1C is 8 or greater, it needs to be on file within the past 3 months.  If less than 8, must be on file within the past 6 months.     Recent Labs   Lab Test  11/09/18   1249   A1C  6.0*          Passed - Patient's CR is NOT>1.4 OR Patient's EGFR is NOT<45 within past 12 mos.    Recent Labs   Lab Test  11/09/18   1249   GFRESTIMATED  >90   GFRESTBLACK  >90     Recent Labs   Lab Test  11/09/18   1249   CR  0.64          Passed - Patient does NOT have a diagnosis of CHF.       Passed - Patient is not pregnant       Passed - Patient has not had a positive pregnancy test within the past 12 mos.        Passed - Recent (6 mo) or future (30 days) visit within the authorizing provider's specialty    Patient had office visit in the last 6 months or has a visit in the next 30 days with authorizing provider or within the authorizing provider's specialty.  See \"Patient Info\" tab in inbasket, or \"Choose Columns\" in Meds & Orders section of the refill encounter.            lovastatin (MEVACOR) 20 MG tablet  Rx was sent 11/09/2018 for 90 tabs and 1 refills.   Pharmacy notified via E-prescribe refusal.  Beryl Ames RN, BSN     metFORMIN (GLUCOPHAGE) 500 MG tablet  Rx was sent 11/09/2018 for 180tabs and 1 refills.   Pharmacy notified via E-prescribe refusal.  Beryl Ames RN, BSN     ANGELA CONTOUR test strips  Prescription approved per [facility] Refill Protocol.    Beryl Ames RN, BSN       "

## 2018-12-05 ENCOUNTER — TELEPHONE (OUTPATIENT)
Dept: FAMILY MEDICINE | Facility: OTHER | Age: 68
End: 2018-12-05

## 2018-12-05 NOTE — TELEPHONE ENCOUNTER
Reason for Call:  Form, our goal is to have forms completed with 72 hours, however, some forms may require a visit or additional information.    Type of letter, form or note:  [facility]    Who is the form from?: same as above (if other please explain)    Where did the form come from: form was faxed in    What clinic location was the form placed at?: [facility] - [phone]    Where the form was placed: 's Box    What number is listed as a contact on the form?: [phone]       Additional comments: sign, date and fax back to [phone]    Call taken on 12/5/2018 at 3:47 PM by Violeta Thao         Current 1 PPD Smoker Current 1 PPD Smoker  Left Bundle Branch Block

## 2018-12-07 ENCOUNTER — TELEPHONE (OUTPATIENT)
Dept: FAMILY MEDICINE | Facility: OTHER | Age: 68
End: 2018-12-07

## 2018-12-07 ENCOUNTER — MEDICAL CORRESPONDENCE (OUTPATIENT)
Dept: HEALTH INFORMATION MANAGEMENT | Facility: CLINIC | Age: 68
End: 2018-12-07

## 2018-12-07 NOTE — TELEPHONE ENCOUNTER
Reason for Call:  Form, our goal is to have forms completed with 72 hours, however, some forms may require a visit or additional information.    Type of letter, form or note:  medical    Who is the form from?: [facility] (if other please explain)    Where did the form come from: form was faxed in    What clinic location was the form placed at?: [facility] - [phone]    Where the form was placed: Dr's Box    What number is listed as a contact on the form?: [phone]       Additional comments: sign fax back    Call taken on 12/7/2018 at 2:45 PM by Kaylen Gibbs

## 2018-12-17 ENCOUNTER — TELEPHONE (OUTPATIENT)
Dept: ORTHOPEDICS | Facility: CLINIC | Age: 68
End: 2018-12-17

## 2018-12-17 NOTE — TELEPHONE ENCOUNTER
Patient has seen Johnathon Santoyo, orthotist at [facility] in [place] for diabetic shoes and orthotics. Insurance requires that an [employer] for Medicare Form to be completed. Faxed form to Dr. Warner on 12/7/18. Patient is being seen on 12/19/18 and clinic cannot see patient unless this is completed and returned to us by 12/19. Attempted to call clinic, but spent over 30 minutes waiting on hold to speak to someone.  Will re-fax form at [phone]. Please fax completed form ASAP to [phone], ATTN: Babs. Please call Babs with any questions or concerns at [phone]. Thank you.

## 2018-12-17 NOTE — TELEPHONE ENCOUNTER
Babs is refaxing it to [place] and she needs this resigned and faxed back to her by tomorrow.

## 2018-12-18 NOTE — TELEPHONE ENCOUNTER
CHRISTOPHER Brice to return phone call to clinic about message below.  Peggy Mustafa CMA ([facility])

## 2018-12-19 NOTE — TELEPHONE ENCOUNTER
Spoke to Babs ([phone]), she is refaxing the form now. Since RK is not here today they are OK with another provider filling this out if they are willing to. They need this back before patients appointment at 3:00pm today. Will await fax  Erlinda Harvey CMA

## 2018-12-19 NOTE — TELEPHONE ENCOUNTER
Form placed in out of office basket for covering providers to review, form marked as URGENT as patient has an appointment today  Erlinda Harvey CMA

## 2018-12-20 ENCOUNTER — MEDICAL CORRESPONDENCE (OUTPATIENT)
Dept: HEALTH INFORMATION MANAGEMENT | Facility: CLINIC | Age: 68
End: 2018-12-20

## 2018-12-21 ENCOUNTER — TELEPHONE (OUTPATIENT)
Dept: FAMILY MEDICINE | Facility: OTHER | Age: 68
End: 2018-12-21

## 2018-12-21 NOTE — TELEPHONE ENCOUNTER
"Vero Scott is a 68 year old female who calls with symptoms. I spoke with her daughter, Rohit.    NURSING ASSESSMENT:  Description: Patient is experiencing chest pressure and shortness of breath.   Onset/duration:  Unknown - \"ongoing\"  Precip. factors: Unknown  Associated symptoms:  Unknown  Improves/worsens symptoms: Unknown  Pain scale (0-10)  Unknown  LMP/preg/breast feeding:  Not asked  Last exam/Treatment:  On file  Allergies:   Allergies   Allergen Reactions     Sulfa Drugs      n&v     RECOMMENDED DISPOSITION:  To ED -  Will comply with recommendation: no - she will not take her to the ED and only wants to see RK.     Next 5 appointments (look out 90 days)    Jan 03, 2019  1:20 PM CST  [facility] [address] [place] [phone]         If further questions/concerns or if Sx do not improve, worsen or new Sx develop, call your PCP or [facility] as soon as possible.    NOTES:  Disposition was determined by the first positive assessment question, therefore all previous assessment questions were negative.     Guideline used:  Telephone Triage Protocols for Nurses, Fifth Edition, Davina Barriga, RN, BSN      "

## 2018-12-27 NOTE — PROGRESS NOTES
SUBJECTIVE:   Vero Scott is a 68 year old female who presents to clinic today for the following health issues:      HPI     Concern - Shortness of Breath, Fatigue, Cough the last 2 days  Onset: 2 years    Description:   Shortness of Breath- 2 years, Cough 2 days- Fleam coming up    Intensity: moderate, 0/10    Progression of Symptoms:  same    Accompanying Signs & Symptoms:  None    Previous history of similar problem:   Yes  Therapies Tried and outcome: cold air, herbal remedies for the cough      Problem list and histories reviewed & adjusted, as indicated.  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 active tuberculosis     Anxiety state     Other osteoporosis     Hypothyroidism     Adenomatous polyp of colon     Hyperlipidemia LDL goal <130     Advanced directives, counseling/discussion     Type 2 diabetes mellitus with other specified complication, without long-term current use of insulin (H)     Obstructive sleep apnea     Patellofemoral disorder of right knee     Rotator cuff syndrome     Pulmonary nodule     Obesity, Class II, BMI 35-39.9     Female stress incontinence     Plantar fasciitis     Bloating     Ventral hernia without obstruction or gangrene     Pelvic pain     Past Surgical History:   Procedure Laterality Date     C MAMMOGRAM, SCREENING  2003, 2002     C TOTAL ABDOM HYSTERECTOMY  2002    with BSO, for cervical dysplasia, no malignancy on biopsy     COLONOSCOPY  11/10/2010    COMBINED COLONOSCOPY, REMOVE TUMOR/POLYP/LESION BY SNARE performed by JOHNATHON BHATT at  GI, recheck in 3 years     COLONOSCOPY N/A 4/10/2015    Procedure: COMBINED COLONOSCOPY, SINGLE OR MULTIPLE BIOPSY/POLYPECTOMY BY BIOPSY;  Surgeon: Johnathon Bhatt MD;  Location: [facility]     COMBINED ESOPHAGOSCOPY, GASTROSCOPY, DUODENOSCOPY (EGD) WITH CO2 INSUFFLATION Bilateral  6/22/2016    Procedure: COMBINED ESOPHAGOSCOPY, GASTROSCOPY, DUODENOSCOPY (EGD) WITH CO2 INSUFFLATION;  Surgeon: Duane, William Charles, MD;  Location: [facility] OR     CONIZATION CERVIX,KNIFE/LASER  2002    CIS     ESOPHAGOSCOPY, GASTROSCOPY, DUODENOSCOPY (EGD), COMBINED N/A 6/22/2016    Procedure: COMBINED ESOPHAGOSCOPY, GASTROSCOPY, DUODENOSCOPY (EGD), BIOPSY SINGLE OR MULTIPLE;  Surgeon: Duane, William Charles, MD;  Location: [facility] OR     HC COLONOSCOPY THRU STOMA W BIOPSY/CAUTERY TUMOR/POLYP/LESION  1/10/2006    adenomatous, recheck in 5 years     HC UGI ENDOSCOPY DIAG W BIOPSY  11/22/05     HC UGI ENDOSCOPY, SIMPLE EXAM  12/03    abnormal     HCL PAP SMEAR  2002     HYSTERECTOMY, PAP NO LONGER INDICATED       SLING TRANSVAGINAL N/A 3/31/2016    Procedure: SLING TRANSVAGINAL;  Surgeon: González Wright MD;  Location: [facility] OR       Social History     Tobacco Use     Smoking status: Never Smoker     Smokeless tobacco: Never Used     Tobacco comment: no smokers in household   Substance Use Topics     Alcohol use: Yes     Comment: occ     Family History   Problem Relation Age of Onset     Cancer No family hx of         breast or colon     Diabetes No family hx of      Hypertension No family hx of      Heart Disease No family hx of         CAD     Breast Cancer No family hx of      Depression No family hx of      Substance Abuse No family hx of      Anesthesia Reaction No family hx of      Genetic Disorder No family hx of          Current Outpatient Medications   Medication Sig Dispense Refill     aspirin 81 MG EC tablet TAKE ONE TABLET BY MOUTH EVERY  tablet 2     ANGELA CONTOUR test strip TEST DAILY AS DIRECTED 50 each 11     ANGELA CONTOUR test strip TEST DAILY AS DIRECTED 50 each 3     ANGELA CONTOUR test strip TEST DAILY AS DIRECTED 50 each 3     blood glucose monitoring (ANGELA MICROLET) lancets TEST DAILY 100 each 5     blood glucose monitoring (ANGELA MICROLET) lancets TEST DAILY 100 each 5     blood glucose  "monitoring (ANGELA MICROLET) lancets TEST DAILY 100 each 5     blood glucose monitoring (NO BRAND SPECIFIED) meter device kit Use to test blood sugar one  times daily or as directed. 1 kit 0     blood glucose monitoring (NO BRAND SPECIFIED) test strip Use to test blood sugars one  times daily or as directed 90 each 3     Blood Pressure KIT Check blood pressures weekly 1 kit 0     Cholecalciferol (VITAMIN D3 PO) Take 600 Units by mouth daily       levothyroxine (SYNTHROID/LEVOTHROID) 75 MCG tablet TAKE ONE TABLET BY MOUTH EVERY MORNING 90 tablet 2     lovastatin (MEVACOR) 20 MG tablet Take 1 tablet (20 mg) by mouth At Bedtime 90 tablet 1     metFORMIN (GLUCOPHAGE) 500 MG tablet Take 1 tablet (500 mg) by mouth 2 times daily (with meals) 180 tablet 1     order for DME Equipment being ordered: super feet size c 1 Device 0     ranitidine (ZANTAC) 150 MG tablet Take 1 tablet (150 mg) by mouth 2 times daily 180 tablet 1     Allergies   Allergen Reactions     Sulfa Drugs      n&v     BP Readings from Last 3 Encounters:   01/03/19 114/76   11/09/18 131/84   05/07/18 126/71    Wt Readings from Last 3 Encounters:   01/03/19 84.4 kg (186 lb)   11/09/18 83.5 kg (184 lb)   05/07/18 83.9 kg (185 lb)                  Labs reviewed in EPIC    ROS:  Constitutional, HEENT, cardiovascular, pulmonary, gi and gu systems are negative, except as otherwise noted.    OBJECTIVE:     /76 (BP Location: Right arm, Patient Position: Chair, Cuff Size: Adult Large)   Pulse 82   Temp 97.6  F (36.4  C) (Temporal)   Resp 16   Ht 1.57 m (5' 1.8\")   Wt 84.4 kg (186 lb)   SpO2 98%   BMI 34.24 kg/m    Body mass index is 34.24 kg/m .   Physical Exam   Constitutional: She appears well-developed and well-nourished.   HENT:   Head: Normocephalic and atraumatic.   Cardiovascular: Normal rate, regular rhythm, normal heart sounds and intact distal pulses. Exam reveals no gallop and no friction rub.   No murmur heard.  Pulmonary/Chest: Breath sounds " normal. No stridor. No respiratory distress. She has no wheezes. She has no rales. She exhibits no tenderness.   Psychiatric: She has a normal mood and affect.         Diagnostic Test Results:  none     ASSESSMENT/PLAN:     Problem List Items Addressed This Visit     Type 2 diabetes mellitus with other specified complication, without long-term current use of insulin (H)      Other Visit Diagnoses     Shortness of breath    -  Primary    Relevant Orders    General PFT Lab (Please always keep checked)    6 minute walk test    Pulmonary Function Test         Patient is a pleasant 68-year-old  lady with history of type 2 diabetes, hypertension, hypothyroidism, hyperlipidemia who is accompanied by her daughter and Mandarin  comes to the clinic with symptoms of shortness of breath at rest mostly while she is at home.  She reports that she has the sensation that she cannot breathe enough air in and has to open the windows and breathe cold air intermittently.  The symptoms have been going on for more than a few years now.  She denies any chest pains or shortness of breath with activity or exertion.  She reports that she can walk for more than 30 minutes without stopping at a moderate pace.  She was previously evaluated for overnight pulse oximetry which came back positive for low oxygen levels but according to the daughter-in-law this test was stopped abruptly as there was an emergency at the house that same day that she had to do the test.  So I do not think this is accurate.  Her oxygen levels today in the clinic are at 98% at rest.  Vitals have all these been stable.  I suspect she has underlying anxiety and seasonal affective disorder which could be contributing to her symptoms.  However given the chronic granulomatous changes noted on her lungs I will get the pulmonary function tests and a 6-minute pulse ox test to rule out any chronic respiratory failure.   I offered medications for anxiety but she  declined them today.Patient is agreeable to the above plan.  Follow the results and treat accordingly.  Marie Warner MD  [facility]

## 2019-01-02 DIAGNOSIS — E78.5 HYPERLIPIDEMIA LDL GOAL <130: ICD-10-CM

## 2019-01-02 DIAGNOSIS — E11.9 TYPE 2 DIABETES MELLITUS WITHOUT COMPLICATION, WITHOUT LONG-TERM CURRENT USE OF INSULIN (H): ICD-10-CM

## 2019-01-03 ENCOUNTER — TELEPHONE (OUTPATIENT)
Dept: FAMILY MEDICINE | Facility: OTHER | Age: 69
End: 2019-01-03

## 2019-01-03 ENCOUNTER — OFFICE VISIT (OUTPATIENT)
Dept: FAMILY MEDICINE | Facility: OTHER | Age: 69
End: 2019-01-03
Payer: COMMERCIAL

## 2019-01-03 VITALS
DIASTOLIC BLOOD PRESSURE: 76 MMHG | HEART RATE: 82 BPM | OXYGEN SATURATION: 98 % | RESPIRATION RATE: 16 BRPM | SYSTOLIC BLOOD PRESSURE: 114 MMHG | BODY MASS INDEX: 34.23 KG/M2 | TEMPERATURE: 97.6 F | WEIGHT: 186 LBS | HEIGHT: 62 IN

## 2019-01-03 DIAGNOSIS — R06.02 SHORTNESS OF BREATH: Primary | ICD-10-CM

## 2019-01-03 DIAGNOSIS — E11.69 TYPE 2 DIABETES MELLITUS WITH OTHER SPECIFIED COMPLICATION, WITHOUT LONG-TERM CURRENT USE OF INSULIN (H): ICD-10-CM

## 2019-01-03 PROCEDURE — 99214 OFFICE O/P EST MOD 30 MIN: CPT | Performed by: FAMILY MEDICINE

## 2019-01-03 RX ORDER — LOVASTATIN 20 MG
TABLET ORAL
Qty: 30 TABLET | Refills: 0 | OUTPATIENT
Start: 2019-01-03

## 2019-01-03 ASSESSMENT — PAIN SCALES - GENERAL: PAINLEVEL: NO PAIN (0)

## 2019-01-03 ASSESSMENT — MIFFLIN-ST. JEOR: SCORE: 1333.77

## 2019-01-03 NOTE — TELEPHONE ENCOUNTER
Patient needs: Schedule PFT's and 6 min walking pulse ox at [facility]. [phone].  Tried to call, but no answer and rolled into VM.  Patient's daughter said to schedule on a Monday, Wednesday or Friday and give her (Madhuri) a call.  Peggy Mustafa CMA ([facility])

## 2019-01-03 NOTE — TELEPHONE ENCOUNTER
for jane to call us back. The only time they do the walking test is on Tuesday and Thursday and the next opening would be at 3pm on Jan. 10th at [place]  Phone [phone].  PFT is scheduled for Monday January 7th at 11:00 am check in at 10:45 at [place]. Phone is [phone]

## 2019-01-03 NOTE — TELEPHONE ENCOUNTER
"Requested Prescriptions   Pending Prescriptions Disp Refills     lovastatin (MEVACOR) 20 MG tablet [Pharmacy Med Name: LOVASTATIN 20MG TABS] 30 tablet 0     Sig: TAKE 1 TABLET (20 MG) BY MOUTH AT BEDTIME    Statins Protocol Passed - 1/2/2019 12:13 PM       Passed - LDL on file in past 12 months    Recent Labs   Lab Test 05/09/18  0826   LDL 60            Passed - No abnormal creatine kinase in past 12 months    No lab results found.            Passed - Recent (12 mo) or future (30 days) visit within the authorizing provider's specialty    Patient had office visit in the last 12 months or has a visit in the next 30 days with authorizing provider or within the authorizing provider's specialty.  See \"Patient Info\" tab in inbasket, or \"Choose Columns\" in Meds & Orders section of the refill encounter.             Passed - Patient is age 18 or older       Passed - No active pregnancy on record       Passed - No positive pregnancy test in past 12 months        metFORMIN (GLUCOPHAGE) 500 MG tablet [Pharmacy Med Name: METFORMIN HCL 500MG TABS] 60 tablet 0     Sig: TAKE ONE TABLET BY MOUTH TWICE A DAY WITH MEALS    Biguanide Agents Passed - 1/2/2019 12:13 PM       Passed - Blood pressure less than 140/90 in past 6 months    BP Readings from Last 3 Encounters:   01/03/19 114/76   11/09/18 131/84   05/07/18 126/71                Passed - Patient has documented LDL within the past 12 mos.    Recent Labs   Lab Test 05/09/18  0826   LDL 60            Passed - Patient has had a Microalbumin in the past 15 mos.    Recent Labs   Lab Test 11/09/18  1259   MICROL 6   UMALCR 7.60            Passed - Patient is age 10 or older       Passed - Patient has documented A1c within the specified period of time.    If HgbA1C is 8 or greater, it needs to be on file within the past 3 months.  If less than 8, must be on file within the past 6 months.     Recent Labs   Lab Test 11/09/18  1249   A1C 6.0*            Passed - Patient's CR is NOT>1.4 OR " "Patient's EGFR is NOT<45 within past 12 mos.    Recent Labs   Lab Test 11/09/18  1249   GFRESTIMATED >90   GFRESTBLACK >90       Recent Labs   Lab Test 11/09/18  1249   CR 0.64            Passed - Patient does NOT have a diagnosis of CHF.       Passed - Patient is not pregnant       Passed - Patient has not had a positive pregnancy test within the past 12 mos.        Passed - Recent (6 mo) or future (30 days) visit within the authorizing provider's specialty    Patient had office visit in the last 6 months or has a visit in the next 30 days with authorizing provider or within the authorizing provider's specialty.  See \"Patient Info\" tab in inbasket, or \"Choose Columns\" in Meds & Orders section of the refill encounter.            Last ov today with RK  Should still have refills.          "

## 2019-01-07 ENCOUNTER — HOSPITAL ENCOUNTER (OUTPATIENT)
Dept: RESPIRATORY THERAPY | Facility: CLINIC | Age: 69
Discharge: HOME | End: 2019-01-07
Attending: FAMILY MEDICINE | Admitting: FAMILY MEDICINE
Payer: COMMERCIAL

## 2019-01-07 DIAGNOSIS — R06.02 SHORTNESS OF BREATH: ICD-10-CM

## 2019-01-07 LAB
DLCOUNC-%PRED-PRE: 106 %
DLCOUNC-PRE: 20.62 ML/MIN/MMHG
DLCOUNC-PRED: 19.29 ML/MIN/MMHG
ERV-%PRED-PRE: 195 %
ERV-PRE: 0.45 L
ERV-PRED: 0.23 L
EXPTIME-PRE: 10.39 SEC
FEF2575-%PRED-PRE: 129 %
FEF2575-PRE: 2.25 L/SEC
FEF2575-PRED: 1.74 L/SEC
FEFMAX-%PRED-PRE: 125 %
FEFMAX-PRE: 6.66 L/SEC
FEFMAX-PRED: 5.31 L/SEC
FEV1-%PRED-PRE: 115 %
FEV1-PRE: 2.17 L
FEV1FEV6-PRE: 81 %
FEV1FEV6-PRED: 80 %
FEV1FVC-PRE: 81 %
FEV1FVC-PRED: 77 %
FEV1SVC-PRE: 83 %
FEV1SVC-PRED: 71 %
FRCPLETH-%PRED-PRE: 109 %
FRCPLETH-PRE: 2.72 L
FRCPLETH-PRED: 2.48 L
FVC-%PRED-PRE: 113 %
FVC-PRE: 2.67 L
FVC-PRED: 2.36 L
GAW-%PRED-PRE: 132 %
GAW-PRE: 1.36 L/S/CMH2O
GAW-PRED: 1.03 L/S/CMH2O
IC-%PRED-PRE: 90 %
IC-PRE: 2.17 L
IC-PRED: 2.4 L
RVPLETH-%PRED-PRE: 124 %
RVPLETH-PRE: 2.27 L
RVPLETH-PRED: 1.82 L
SGAW-%PRED-PRE: 508 %
SGAW-PRE: 0.51 1/CMH2O*S
SGAW-PRED: 0.1 1/CMH2O*S
SRAW-%PRED-PRE: 41 %
SRAW-PRE: 1.97 CMH2O*S
SRAW-PRED: 4.76 CMH2O*S
TLCPLETH-%PRED-PRE: 114 %
TLCPLETH-PRE: 4.89 L
TLCPLETH-PRED: 4.27 L
VA-%PRED-PRE: 101 %
VA-PRE: 4.48 L
VC-%PRED-PRE: 99 %
VC-PRE: 2.62 L
VC-PRED: 2.63 L

## 2019-01-07 PROCEDURE — 94729 DIFFUSING CAPACITY: CPT

## 2019-01-07 PROCEDURE — 94010 BREATHING CAPACITY TEST: CPT

## 2019-01-07 PROCEDURE — 94726 PLETHYSMOGRAPHY LUNG VOLUMES: CPT

## 2019-01-07 NOTE — PROGRESS NOTES
Pre-Bronch    Post-Bronch         Actual Pred %Pred LLN Actual %Pred %Chng       ---- SPIROMETRY ----                          FVC (L)  2.67 2.36 113 1.77                  FEV1 (L)  2.17 1.88 115 1.39                  FEV1/FVC (%) 81 77   67                  FEF 25% (L/sec) 6.27                        FEF 75% (L/sec) 0.75                        FEF 25-75% (L/sec) 2.25 1.74 129 0.65                  FEF Max (L/sec) 6.66 5.31 125 3.81                  FIVC (L) 0.20                        FIF Max (L/sec)   4.22   2.42                  MVV (L/min)   80   62                  MEP (cmH2O)                          MIP (cmH2O)                                                     ---- LUNG VOLUMES ----                          SVC (L) 2.62 2.63 99 1.85                  IC (L) 2.17 2.40 90                    ERV (L) 0.45 0.23 195                    FRC (N2) (L)   2.48   1.48                  RV (N2) (L)   1.82   1.12                  TLC (N2) (L)   4.27   3.07                  RV/TLC (N2) (%)   42   30                  [place] Time (min)                          FRC(Pleth) (L) 2.72 2.48 109 1.48                  RV (Pleth) (L) 2.27 1.82 124 1.12                  TLC (Pleth) (L) 4.89 4.27 114 3.07                  RV/TLC (Pleth) (%) 46 42   30                  Trapped Gas (L)                                                     ---- DIFFUSION ----                          DLCOunc (ml/min/mmHg) 20.62 19.29 106 11.79                  DLCOcor (ml/min/mmHg)   19.29   11.79                  DL/VA (ml/min/mmHg/L) 4.60 4.37                      VA (L) 4.48 4.41 101 3.30                  Hgb (gm/dL)   12-18                                                 ---- AIRWAYS RESISTANCE ----                          Raw (cmH2O/L/s) 0.74 2.24 32                    Gaw (L/s/cmH2O) 1.36 1.03 132                    sRaw (cmH2O*s) 1.97 < 4.76                      sGaw (1/cmH2O*s) 0.51 0.10 508

## 2019-01-07 NOTE — DISCHARGE INSTRUCTIONS
Thank you for completing pulmonary function testing today.  All results will be scanned into your epic results for your doctor to review.  Please resume taking all your current prescribed medications and diet as directed by your provider.   If your have not heard from your provider about your testing with in two weeks and do not have a follow-up appointment scheduled with them please contact your provider about an questions you have concerning your testing.   Thank you  [facility]monary Function [facility]

## 2019-01-07 NOTE — LETTER
January 7, 2019      Vero Scott  [address] [facility] [address]        Dear Vero,     This letter is to inform you that your recent lung function test was normal.    Results for orders placed or performed during the hospital encounter of 01/07/19   General PFT Lab (Please always keep checked)   Result Value Ref Range    FVC-Pred 2.36 L    FVC-Pre 2.67 L    FVC-%Pred-Pre 113 %    FEV1-Pre 2.17 L    FEV1-%Pred-Pre 115 %    FEV1FVC-Pred 77 %    FEV1FVC-Pre 81 %    FEFMax-Pred 5.31 L/sec    FEFMax-Pre 6.66 L/sec    FEFMax-%Pred-Pre 125 %    FEF2575-Pred 1.74 L/sec    FEF2575-Pre 2.25 L/sec    HCY9675-%Pred-Pre 129 %    ExpTime-Pre 10.39 sec    VC-Pred 2.63 L    VC-Pre 2.62 L    VC-%Pred-Pre 99 %    IC-Pred 2.40 L    IC-Pre 2.17 L    IC-%Pred-Pre 90 %    ERV-Pred 0.23 L    ERV-Pre 0.45 L    ERV-%Pred-Pre 195 %    FEV1FEV6-Pred 80 %    FEV1FEV6-Pre 81 %    FRCPleth-Pred 2.48 L    FRCPleth-Pre 2.72 L    FRCPleth-%Pred-Pre 109 %    RVPleth-Pred 1.82 L    RVPleth-Pre 2.27 L    RVPleth-%Pred-Pre 124 %    TLCPleth-Pred 4.27 L    TLCPleth-Pre 4.89 L    TLCPleth-%Pred-Pre 114 %    Gaw-Pred 1.03 L/s/cmH2O    Gaw-Pre 1.36 L/s/cmH2O    Gaw-%Pred-Pre 132 %    sGaw-Pred 0.10 1/cmH2O*s    sGaw-Pre 0.51 1/cmH2O*s    sGaw-%Pred-Pre 508 %    sRaw-Pred 4.76 cmH2O*s    sRaw-Pre 1.97 cmH2O*s    sRaw-%Pred-Pre 41 %    DLCOunc-Pred 19.29 ml/min/mmHg    DLCOunc-Pre 20.62 ml/min/mmHg    DLCOunc-%Pred-Pre 106 %    VA-Pre 4.48 L    VA-%Pred-Pre 101 %    FEV1SVC-Pred 71 %    FEV1SVC-Pre 83 %    Narrative    The FVC, FEV1, FEV1/FVC ratio and QLU53-01% are within normal limits.  Lung volumes are within normal limits.  The diffusing capacity is normal.  However, the diffusing capacity was not corrected for the patient's hemoglobin.  The results are within normal limits.  IMPRESSION:  Normal Pulmonary Function  ____________________________________________SIMIN SOSA       Please let us know if you have any further questions or  concerns.    Thanks,  [facility] Team

## 2019-01-08 ENCOUNTER — TELEPHONE (OUTPATIENT)
Dept: SLEEP MEDICINE | Facility: CLINIC | Age: 69
End: 2019-01-08

## 2019-01-08 NOTE — TELEPHONE ENCOUNTER
Patient was only scheduled for 15 minutes and needs interp. She is wondering if it is ok to keep this appt as she says she scheduled it a month ago.

## 2019-01-09 ENCOUNTER — OFFICE VISIT (OUTPATIENT)
Dept: OTOLARYNGOLOGY | Facility: OTHER | Age: 69
End: 2019-01-09
Payer: COMMERCIAL

## 2019-01-09 VITALS
DIASTOLIC BLOOD PRESSURE: 88 MMHG | OXYGEN SATURATION: 96 % | WEIGHT: 187 LBS | SYSTOLIC BLOOD PRESSURE: 132 MMHG | BODY MASS INDEX: 34.42 KG/M2 | HEART RATE: 66 BPM

## 2019-01-09 DIAGNOSIS — H90.2 CONDUCTIVE HEARING LOSS, UNILATERAL: Primary | ICD-10-CM

## 2019-01-09 PROCEDURE — 99203 OFFICE O/P NEW LOW 30 MIN: CPT | Performed by: OTOLARYNGOLOGY

## 2019-01-09 NOTE — PROGRESS NOTES
ENT Consultation    Vero Scott is a 68 year old female who is seen in consultation at the request of .      History of Present Illness - Vero Scott is a 68 year old female left ear pain for some time. No drainage  This patient was previously seen over 3 years ago.  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  She feels her hearing is not dramatically improved over the years but stayed the same.  She denies any discharge any otalgia any tinnitus or vertigo.  She denies any prior history of known infections.  Body mass index is 34.42 kg/m .    Weight management plan: Patient was referred to their PCP to discuss a diet and exercise plan.    BP Readings from Last 1 Encounters:   01/09/19 132/88       BP noted to be well controlled today in office.     Vero IS NOT a smoker/uses chewing tobacco.      Past Medical History -   Past Medical History:   Diagnosis Date     Abnormal mammogram, unspecified 2002    stable left outer breast nodule     Adenomatous polyp of colon 10/2010    recheck colonoscopy in 3 years     Anxiety state, unspecified 12/05     Cough     chronic, attributed to GERD     Dermatophytosis of foot 12/05     Diabetic eye exam (H) 09/25/2013    [facility]     Diabetic eye exam (H) 09/29/2014    [facility]     Dysplasia of cervix (uteri) 2002    HGSIL     Esophageal reflux     atypical chest pain, nocturnal cough     Helicobacter pylori (H. 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     possible 2 cm submucosal esophageal mass (vs extrinsic compression) just above GE junction     Postmenopausal atrophic vaginitis      Reflux esophagitis 12/03    chronic esophagitis on biopsy     Symptomatic states associated with artificial menopause 2002     Thyroid disease      Tuberculin test reaction     positive PPD, 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, no known h/o TB       Current Medications -   Current Outpatient Medications:      aspirin 81 MG EC tablet, TAKE ONE TABLET BY MOUTH EVERY DAY, Disp: 120 tablet, Rfl: 2     ANGELA CONTOUR test strip, TEST DAILY AS DIRECTED, Disp: 50 each, Rfl: 11     ANGELA CONTOUR test strip, TEST DAILY AS DIRECTED, Disp: 50 each, Rfl: 3     ANGELA CONTOUR test strip, TEST DAILY AS DIRECTED, Disp: 50 each, Rfl: 3     blood glucose monitoring (ANGELA MICROLET) lancets, TEST DAILY, Disp: 100 each, Rfl: 5     blood glucose monitoring (ANGELA MICROLET) lancets, TEST DAILY, Disp: 100 each, Rfl: 5     blood glucose monitoring (ANGELA MICROLET) lancets, TEST DAILY, Disp: 100 each, Rfl: 5     blood glucose monitoring (NO BRAND SPECIFIED) meter device kit, Use to test blood sugar one  times daily or as directed., Disp: 1 kit, Rfl: 0     blood glucose monitoring (NO BRAND SPECIFIED) test strip, Use to test blood sugars one  times daily or as directed, Disp: 90 each, Rfl: 3     Blood Pressure KIT, Check blood pressures weekly, Disp: 1 kit, Rfl: 0     Cholecalciferol (VITAMIN D3 PO), Take 600 Units by mouth daily, Disp: , Rfl:      levothyroxine (SYNTHROID/LEVOTHROID) 75 MCG tablet, TAKE ONE TABLET BY MOUTH EVERY MORNING, Disp: 90 tablet, Rfl: 2     lovastatin (MEVACOR) 20 MG tablet, Take 1 tablet (20 mg) by mouth At Bedtime, Disp: 90 tablet, Rfl: 1     metFORMIN (GLUCOPHAGE) 500 MG tablet, Take 1 tablet (500 mg) by mouth 2 times daily (with meals), Disp: 180 tablet, Rfl: 1     order for DME, Equipment being ordered: super feet size c, Disp: 1 Device, Rfl: 0     ranitidine (ZANTAC) 150 MG tablet, Take 1 tablet (150 mg) by  mouth 2 times daily, Disp: 180 tablet, Rfl: 1    Allergies -   Allergies   Allergen Reactions     Sulfa Drugs      n&v       Social History -   Social History     Socioeconomic History     Marital status: Legally      Spouse name: Not on file     Number of children: 3     Years of education: Not on file     Highest education level: Not on file   Social Needs     Financial resource strain: Not on file     Food insecurity - worry: Not on file     Food insecurity - inability: Not on file     Transportation needs - medical: Not on file     Transportation needs - non-medical: Not on file   Occupational History     Occupation: homemaker   Tobacco Use     Smoking status: Never Smoker     Smokeless tobacco: Never Used     Tobacco comment: no smokers in household   Substance and Sexual Activity     Alcohol use: Yes     Comment: occ     Drug use: No     Sexual activity: Yes     Partners: Male     Comment: hysterectomy complete   Other Topics Concern     Parent/sibling w/ CABG, MI or angioplasty before 65F 55M? No   Social History Narrative     Not on file       Family History -   Family History   Problem Relation Age of Onset     Cancer No family hx of         breast or colon     Diabetes No family hx of      Hypertension No family hx of      Heart Disease No family hx of         CAD     Breast Cancer No family hx of      Depression No family hx of      Substance Abuse No family hx of      Anesthesia Reaction No family hx of      Genetic Disorder No family hx of        Review of Systems - As per HPI and PMHx, otherwise review of system review of the head and neck negative. Otherwise 10+ review systems negative.    Physical Exam  /88   Pulse 66   Wt 84.8 kg (187 lb)   SpO2 96%   BMI 34.42 kg/m    BMI: Body mass index is 34.42 kg/m .    General - The patient is well nourished and well developed, and appears to have good nutritional status.  Alert and oriented to person and place, answers questions and  cooperates with examination appropriately.    SKIN - No suspicious lesions or rashes.  Respiration - No respiratory distress.  Head and Face - Normocephalic and atraumatic, with no gross asymmetry noted of the contour of the facial features.  The facial nerve is intact, with strong symmetric movements.    Voice and Breathing - The patient was breathing comfortably without the use of accessory muscles. The patients voice was clear and strong, and had appropriate pitch and quality.    Ears - Bilateral pinna and EACs with normal appearing overlying skin.  On the right side which is some mild myringosclerosis with the TM mobile to insufflation.  On the left we see marginal moderate myringosclerosis with dimeric TM no obvious evidence of perforation.  Ear canals are clear and dry..     Eyes - Extraocular movements intact.  Sclera were not icteric or injected, conjunctiva were pink and moist.    Mouth - Examination of the oral cavity showed pink, healthy oral mucosa. No lesions or ulcerations noted.  The tongue was mobile and midline, and the dentition were in good condition.      Throat - The walls of the oropharynx were smooth, pink, moist, symmetric, and had no lesions or ulcerations.  The tonsillar pillars and soft palate were symmetric.  The uvula was midline on elevation.    Neck - Normal midline excursion of the laryngotracheal complex during swallowing.  Full range of motion on passive movement.  Palpation of the occipital, submental, submandibular, internal jugular chain, and supraclavicular nodes did not demonstrate any abnormal lymph nodes or masses.  The carotid pulse was palpable bilaterally.  Palpation of the thyroid was soft and smooth, with no nodules or goiter appreciated.  The trachea was mobile and midline.    Nose - External contour is symmetric, no gross deflection or scars.  Nasal mucosa is pink and moist with no abnormal mucus.  The septum was midline and non-obstructive, turbinates of normal size  and position.  No polyps, masses, or purulence noted on examination.    Neuro - Nonfocal neuro exam is normal, CN 2 through 12 intact, normal gait and muscle tone.      Performed in clinic today:  Audiologic Studies - An audiogram and tympanogram were performed today as part of the evaluation and personally reviewed. The tympanogram shows Type A curves on the right and Type Could not seal curves on the left, with Normal on the right and high on the left canal volumes and middle ear pressures.  The audiogram showed Overall normal hearing on the right on the right and Mild conductive losson the left.  Tuning fork testing at 512 Hz shows Staples slightly lateralizing to the left and Rinne testing AC equal to BC on the left.      A/P - Vero Scott is a 68 year old female with mild conductive loss on the left.  We discussed options so he wants to explore surgically or get a CT scan of temporal bone first and then explore based on the findings.  The other option is to try hearing aid.  She is trial hearing at about 4 years ago but only for a few days and like it.  We convinced her to give it another try.  She is interested in hearing device rather than surgery at this point we will get another attempt.  Will we will see her back in 3-4 months to recheck.      Steffen Mckeon MD

## 2019-01-09 NOTE — LETTER
1/9/2019         RE: Vero Scott  [id number] [facility] [address]        Dear Colleague,    Thank you for referring your patient, Vero Scott, to the [facility]. Please see a copy of my visit note below.    ENT Consultation    Vero Scott is a 68 year old female who is seen in consultation at the request of .      History of Present Illness - Vero Scott is a 68 year old female left ear pain for some time. No drainage  This patient was previously seen over 3 years ago.  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  She feels her hearing is not dramatically improved over the years but stayed the same.  She denies any discharge any otalgia any tinnitus or vertigo.  She denies any prior history of known infections.  Body mass index is 34.42 kg/m .    Weight management plan: Patient was referred to their PCP to discuss a diet and exercise plan.    BP Readings from Last 1 Encounters:   01/09/19 132/88       BP noted to be well controlled today in office.     Vero IS NOT a smoker/uses chewing tobacco.      Past Medical History -   Past Medical History:   Diagnosis Date     Abnormal mammogram, unspecified 2002    stable left outer breast nodule     Adenomatous polyp of colon 10/2010    recheck colonoscopy in 3 years     Anxiety state, unspecified 12/05     Cough     chronic, attributed to GERD     Dermatophytosis of foot 12/05     Diabetic eye exam (H) 09/25/2013    [facility]     Diabetic eye exam (H) 09/29/2014    [facility]     Dysplasia of cervix (uteri) 2002    HGSIL     Esophageal reflux     atypical chest pain, nocturnal cough     Helicobacter pylori (H. 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    possible 2 cm submucosal esophageal mass (vs extrinsic compression) just above GE junction     Postmenopausal atrophic vaginitis      Reflux esophagitis 12/03    chronic esophagitis on biopsy     Symptomatic states associated with artificial menopause 2002     Thyroid disease      Tuberculin test reaction     positive PPD, neg CXR, no known h/o TB       Current Medications -   Current Outpatient Medications:      aspirin 81 MG EC tablet, TAKE ONE TABLET BY MOUTH EVERY DAY, Disp: 120 tablet, Rfl: 2     ANGELA CONTOUR test strip, TEST DAILY AS DIRECTED, Disp: 50 each, Rfl: 11     ANGELA CONTOUR test strip, TEST DAILY AS DIRECTED, Disp: 50 each, Rfl: 3     ANGELA CONTOUR test strip, TEST DAILY AS DIRECTED, Disp: 50 each, Rfl: 3     blood glucose monitoring (ANGELA MICROLET) lancets, TEST DAILY, Disp: 100 each, Rfl: 5     blood glucose monitoring (ANGELA MICROLET) lancets, TEST DAILY, Disp: 100 each, Rfl: 5     blood glucose monitoring (ANGELA MICROLET) lancets, TEST DAILY, Disp: 100 each, Rfl: 5     blood glucose monitoring (NO BRAND SPECIFIED) meter device kit, Use to test blood sugar one  times daily or as directed., Disp: 1 kit, Rfl: 0     blood glucose monitoring (NO BRAND SPECIFIED) test strip, Use to test blood sugars one  times daily or as directed, Disp: 90 each, Rfl: 3     Blood Pressure KIT, Check blood pressures weekly, Disp: 1 kit, Rfl: 0     Cholecalciferol (VITAMIN D3 PO), Take 600 Units by mouth daily, Disp: , Rfl:      levothyroxine (SYNTHROID/LEVOTHROID) 75 MCG tablet, TAKE ONE TABLET BY MOUTH EVERY MORNING, Disp: 90 tablet, Rfl: 2     lovastatin (MEVACOR) 20 MG tablet, Take 1 tablet (20 mg) by mouth At Bedtime, Disp: 90 tablet, Rfl: 1     metFORMIN (GLUCOPHAGE) 500 MG  tablet, Take 1 tablet (500 mg) by mouth 2 times daily (with meals), Disp: 180 tablet, Rfl: 1     order for DME, Equipment being ordered: super feet size c, Disp: 1 Device, Rfl: 0     ranitidine (ZANTAC) 150 MG tablet, Take 1 tablet (150 mg) by mouth 2 times daily, Disp: 180 tablet, Rfl: 1    Allergies -   Allergies   Allergen Reactions     Sulfa Drugs      n&v       Social History -   Social History     Socioeconomic History     Marital status: Legally      Spouse name: Not on file     Number of children: 3     Years of education: Not on file     Highest education level: Not on file   Social Needs     Financial resource strain: Not on file     Food insecurity - worry: Not on file     Food insecurity - inability: Not on file     Transportation needs - medical: Not on file     Transportation needs - non-medical: Not on file   Occupational History     Occupation: homemaker   Tobacco Use     Smoking status: Never Smoker     Smokeless tobacco: Never Used     Tobacco comment: no smokers in household   Substance and Sexual Activity     Alcohol use: Yes     Comment: occ     Drug use: No     Sexual activity: Yes     Partners: Male     Comment: hysterectomy complete   Other Topics Concern     Parent/sibling w/ CABG, MI or angioplasty before 65F 55M? No   Social History Narrative     Not on file       Family History -   Family History   Problem Relation Age of Onset     Cancer No family hx of         breast or colon     Diabetes No family hx of      Hypertension No family hx of      Heart Disease No family hx of         CAD     Breast Cancer No family hx of      Depression No family hx of      Substance Abuse No family hx of      Anesthesia Reaction No family hx of      Genetic Disorder No family hx of        Review of Systems - As per HPI and PMHx, otherwise review of system review of the head and neck negative. Otherwise 10+ review systems negative.    Physical Exam  /88   Pulse 66   Wt 84.8 kg (187 lb)    SpO2 96%   BMI 34.42 kg/m     BMI: Body mass index is 34.42 kg/m .    General - The patient is well nourished and well developed, and appears to have good nutritional status.  Alert and oriented to person and place, answers questions and cooperates with examination appropriately.    SKIN - No suspicious lesions or rashes.  Respiration - No respiratory distress.  Head and Face - Normocephalic and atraumatic, with no gross asymmetry noted of the contour of the facial features.  The facial nerve is intact, with strong symmetric movements.    Voice and Breathing - The patient was breathing comfortably without the use of accessory muscles. The patients voice was clear and strong, and had appropriate pitch and quality.    Ears - Bilateral pinna and EACs with normal appearing overlying skin.  On the right side which is some mild myringosclerosis with the TM mobile to insufflation.  On the left we see marginal moderate myringosclerosis with dimeric TM no obvious evidence of perforation.  Ear canals are clear and dry..     Eyes - Extraocular movements intact.  Sclera were not icteric or injected, conjunctiva were pink and moist.    Mouth - Examination of the oral cavity showed pink, healthy oral mucosa. No lesions or ulcerations noted.  The tongue was mobile and midline, and the dentition were in good condition.      Throat - The walls of the oropharynx were smooth, pink, moist, symmetric, and had no lesions or ulcerations.  The tonsillar pillars and soft palate were symmetric.  The uvula was midline on elevation.    Neck - Normal midline excursion of the laryngotracheal complex during swallowing.  Full range of motion on passive movement.  Palpation of the occipital, submental, submandibular, internal jugular chain, and supraclavicular nodes did not demonstrate any abnormal lymph nodes or masses.  The carotid pulse was palpable bilaterally.  Palpation of the thyroid was soft and smooth, with no nodules or goiter  appreciated.  The trachea was mobile and midline.    Nose - External contour is symmetric, no gross deflection or scars.  Nasal mucosa is pink and moist with no abnormal mucus.  The septum was midline and non-obstructive, turbinates of normal size and position.  No polyps, masses, or purulence noted on examination.    Neuro - Nonfocal neuro exam is normal, CN 2 through 12 intact, normal gait and muscle tone.      Performed in clinic today:  Audiologic Studies - An audiogram and tympanogram were performed today as part of the evaluation and personally reviewed. The tympanogram shows Type A curves on the right and Type Could not seal curves on the left, with Normal on the right and high on the left canal volumes and middle ear pressures.  The audiogram showed Overall normal hearing on the right on the right and Mild conductive losson the left.  Tuning fork testing at 512 Hz shows Staples slightly lateralizing to the left and Rinne testing AC equal to BC on the left.      A/P - Vero Scott is a 68 year old female with mild conductive loss on the left.  We discussed options so he wants to explore surgically or get a CT scan of temporal bone first and then explore based on the findings.  The other option is to try hearing aid.  She is trial hearing at about 4 years ago but only for a few days and like it.  We convinced her to give it another try.  She is interested in hearing device rather than surgery at this point we will get another attempt.  Will we will see her back in 3-4 months to recheck.      Steffen Mckeon MD    Again, thank you for allowing me to participate in the care of your patient.        Sincerely,        Steffen Mckeon MD, MD

## 2019-01-10 ENCOUNTER — HOSPITAL ENCOUNTER (OUTPATIENT)
Dept: CARDIAC REHAB | Facility: CLINIC | Age: 69
End: 2019-01-10
Attending: FAMILY MEDICINE
Payer: COMMERCIAL

## 2019-01-10 DIAGNOSIS — R06.02 SHORTNESS OF BREATH: ICD-10-CM

## 2019-01-10 PROCEDURE — 40001038 ZZH STATISTIC RESPIRATORY TESTING VISIT: Performed by: REHABILITATION PRACTITIONER

## 2019-01-10 PROCEDURE — 94618 PULMONARY STRESS TESTING: CPT | Performed by: REHABILITATION PRACTITIONER

## 2019-01-14 ENCOUNTER — OFFICE VISIT (OUTPATIENT)
Dept: AUDIOLOGY | Facility: OTHER | Age: 69
End: 2019-01-14
Payer: COMMERCIAL

## 2019-01-14 DIAGNOSIS — H90.12 CONDUCTIVE HEARING LOSS OF LEFT EAR WITH UNRESTRICTED HEARING OF RIGHT EAR: Primary | ICD-10-CM

## 2019-01-14 DIAGNOSIS — E11.9 TYPE 2 DIABETES MELLITUS WITHOUT COMPLICATION (H): ICD-10-CM

## 2019-01-14 PROCEDURE — V5275 EAR IMPRESSION: HCPCS | Mod: LT | Performed by: AUDIOLOGIST

## 2019-01-14 PROCEDURE — 99207 ZZC NO CHARGE LOS: CPT | Performed by: AUDIOLOGIST

## 2019-01-14 PROCEDURE — 92590 HC HEARING AID EXAM MONAURAL: CPT | Performed by: AUDIOLOGIST

## 2019-01-14 NOTE — PROGRESS NOTES
AUDIOLOGY REPORT    SUBJECTIVE: Vero Scott is a 68 year old female was seen in the Audiology Clinic at  [facility] on 1/14/19 to discuss concerns with hearing and functional communication difficulties. The patient was accompanied by her daughter and . Vero has been seen previously on 11/19/18, and results revealed a left ear conductive hearing loss.  The patient was medically evaluated and determined to be cleared for a left hearing aid by Steffen Mckeon MD. Vero notes difficulty with communication in a variety of listening situations.}    OBJECTIVE:  Patient is a hearing aid candidate. Patient would like to move forward with a hearing aid evaluation today. Therefore, the patient was presented with different options for amplification to help aid in communication. Discussed styles, levels of technology and monaural vs. binaural fitting.     The hearing aid(s) mutually chosen were:  Left: Oticon Opn 2 Half shell  COLOR: Tan  BATTERY SIZE: 312    Otoscopy revealed ears are clear of cerumen bilaterally. A left earmold was taken without incident.    ASSESSMENT:   Conductive hearing loss of left ear    Reviewed purchase information and warranty information with patient. The 45 day trial period was explained to patient. The patient was given a copy of the [place] [employer] consumer brochure on purchasing hearing instruments. Patient risk factors have been provided to the patient in writing prior to the sale of the hearing aid per FDA regulation. The risk factors are also available in the User Instructional Booklet to be presented on the day of the hearing aid fitting. Hearing aid(s) ordered. Hearing aid evaluation completed.    PLAN: Vero is scheduled to return in 2-3 weeks for a hearing aid fitting and programming. Purchase agreement will be completed on that date. Please contact this clinic with any questions or concerns.    Russell Jeong MA, CCC-A  [place] Licensed Audiologist  #[id number]  1/14/2019

## 2019-01-14 NOTE — TELEPHONE ENCOUNTER
Need new rx sent for New monitor for MUBI Contour Glucose Monitor  Dali Nguyen, Pharmacy [facility] [place] [phone]

## 2019-01-24 ENCOUNTER — OFFICE VISIT (OUTPATIENT)
Dept: AUDIOLOGY | Facility: OTHER | Age: 69
End: 2019-01-24
Payer: COMMERCIAL

## 2019-01-24 DIAGNOSIS — H90.3 SENSORINEURAL HEARING LOSS, ASYMMETRICAL: Primary | ICD-10-CM

## 2019-01-24 PROCEDURE — V5241 DISPENSING FEE, MONAURAL: HCPCS | Mod: LT | Performed by: AUDIOLOGIST

## 2019-01-24 PROCEDURE — V5011 HEARING AID FITTING/CHECKING: HCPCS | Mod: LT | Performed by: AUDIOLOGIST

## 2019-01-24 PROCEDURE — 99207 ZZC NO CHARGE LOS: CPT | Performed by: AUDIOLOGIST

## 2019-01-24 PROCEDURE — V5255 HEARING AID, DIGIT, MON, ITC: HCPCS | Mod: NU | Performed by: AUDIOLOGIST

## 2019-01-24 PROCEDURE — 92592 HC HEARING AID CHECK, MONAURAL: CPT | Mod: LT | Performed by: AUDIOLOGIST

## 2019-01-24 PROCEDURE — V5020 CONFORMITY EVALUATION: HCPCS | Mod: LT | Performed by: AUDIOLOGIST

## 2019-01-24 NOTE — PROGRESS NOTES
AUDIOLOGY REPORT    SUBJECTIVE: Vero Scott, a 68 year old female, was seen in the Audiology Clinic at [facility] today for a Left hearing aid fitting. Previous results have revealed a unilateral mild to moderate conductive hearing loss. The patient was given medical clearance to pursue amplification by  Steffen Mckeon MD..    OBJECTIVE:  Prior to fitting, a hearing aid check was performed to ensure device functionality. The hearing aid conformity evaluation was completed.The hearing aids were placed and they provided a good fit. Real-ear-probe-microphone measurements were completed on the [employer] system and were a good match to NAL-NL2 target with soft sounds audible, moderate sounds comfortable, and loud sounds below discomfort. UCLs are verified through maximum power output measures and demonstrate appropriate limiting of loud inputs. Ms. Scott was oriented to proper hearing aid use, care, cleaning (no water, dry brush), batteries (size 312, insertion/removal, toxicity, low-battery signal), aid insertion/removal, user booklet, warranty information, storage cases, and other hearing aid details. The patient confirmed understanding of hearing aid use and care, and showed proper insertion of hearing aid and batteries while in the office today. Ms. Scott reported good volume and sound quality today.    EAR(S) FIT: Left  MA HEARING AID MAKE: Right:  ; Left: Alliqua  MA HEARING AID MODEL #: Right:  ; Left: [id number]  HEARING AID STYLE: Right:  ; Left:  (Opn 2) Half Shell  DOME SIZE: Right:   ; Left::      LENGTH: Right:   ; Left:     EARMOLDS: Right:  ; Left:     SERIAL NUMBERS: Right:  ; Left: [id number]  WARRANTY END DATE: Right:  ; Left:: 2/20/2022      CHARGES:     Hearing Aid Check: Monaural, 72002, $49.00  Dispensing Fee: Monaural, , $400.00, Modifier:LT (Left)  Fit/Orientation: Monaural, , $161.00  Hearing Aid Conformity Evaluation: , Qty:1 MODIFIER LT, $87.00  Hearing Aid  Digital: Monaural, [facility], . MODIFIER,LT, NU, $2103.00    Total Charges;  $2800.00     ASSESSMENT: Left ear hearing aid fitting completed today. Verification measures were performed. The 45 day trial period was explained to patient, and they expressed understanding. Ms. Scott signed the Hearing Aid Purchase Agreement and was given a copy, as well as details on her hearing aids. Patient was counseled that exact out of pocket amounts cannot be determined for hearing aid claims being sent to insurance. Any insurance coverage information presented to the patient is an estimate only, and is not a guarantee of payment. Patient has been advised to check with their own insurance.    PLAN: Ms. Scott will return for follow-up in 2-3 weeks for a hearing aid review appointment. Please call this clinic with questions regarding today s appointment.    Russell Jeong MA, CCC-A  [facility], Lic [id number]

## 2019-01-31 ENCOUNTER — OFFICE VISIT (OUTPATIENT)
Dept: AUDIOLOGY | Facility: CLINIC | Age: 69
End: 2019-01-31
Payer: COMMERCIAL

## 2019-01-31 DIAGNOSIS — E03.9 HYPOTHYROIDISM, UNSPECIFIED TYPE: ICD-10-CM

## 2019-01-31 DIAGNOSIS — E11.9 TYPE 2 DIABETES MELLITUS WITHOUT COMPLICATION, WITHOUT LONG-TERM CURRENT USE OF INSULIN (H): ICD-10-CM

## 2019-01-31 DIAGNOSIS — H90.A12 CONDUCTIVE HEARING LOSS, UNILATERAL, LEFT EAR WITH RESTRICTED HEARING ON THE CONTRALATERAL SIDE: Primary | ICD-10-CM

## 2019-01-31 DIAGNOSIS — E78.5 HYPERLIPIDEMIA LDL GOAL <130: ICD-10-CM

## 2019-01-31 PROCEDURE — V5266 BATTERY FOR HEARING DEVICE: HCPCS | Performed by: AUDIOLOGIST

## 2019-01-31 PROCEDURE — 99207 ZZC NO CHARGE LOS: CPT | Performed by: AUDIOLOGIST

## 2019-01-31 PROCEDURE — V5299 HEARING SERVICE: HCPCS | Performed by: AUDIOLOGIST

## 2019-01-31 NOTE — PROGRESS NOTES
AUDIOLOGY REPORT    SUBJECTIVE:Vero Scott is a 68 year old female who was seen in the Audiology Clinic at the [facility] on 1/31/2019  for a follow-up check regarding the fitting of new hearing aids. Previous results have revealed unilateral conductive hearing loss of left ear.  The patient has been seen previously in this clinic and was fit with Oticon OPN2 ITC  on 1/24/19.  Vero reports background noise too loud and overall the hearing aid is too loud.    OBJECTIVE:     Based on patient report, the following changes were made.  Low frequency gain was reduced in Program 1 and I added a program 2 specifically for speech in noise environment which has lower gain than P1.    Reviewed 45 day trial period, care, cleaning (no water, dry brush), batteries (size 312) insertion/removal, toxicity, low-battery signal), aid insertion/removal, volume adjustment (if applicable), user booklet, warranty information, storage cases, and other hearing aid details.     Per guidelines of patient's insurance, 18 units of size 312 batteries were dispensed today. Reviewed that patent is eligable for batteries once every 90 days, and how they can request new batteries.    ASSESSMENT: A follow-up appointment for hearing aid fitting was completed today. Changes to hearing aid was completed as outlined above.     PLAN:Vero will return for follow-up in 2 months when she returns from her trip. Please call this clinic with any questions regarding today s appointment.    Russell Jeong MA, CCC-A  [place] Licensed Audiologist #[id number]  1/31/2019

## 2019-02-01 RX ORDER — LOVASTATIN 20 MG
TABLET ORAL
Qty: 30 TABLET | Refills: 0 | OUTPATIENT
Start: 2019-02-01

## 2019-02-01 RX ORDER — LEVOTHYROXINE SODIUM 75 UG/1
TABLET ORAL
Qty: 30 TABLET | Refills: 0 | Status: SHIPPED | OUTPATIENT
Start: 2019-02-01 | End: 2019-03-15

## 2019-02-01 NOTE — TELEPHONE ENCOUNTER
Left message for patient to call back. Please see message below.  Corinna Redmond, KONRAD    levothyroxine (SYNTHROID/LEVOTHROID) 75 MCG tablet  Medication is being filled for 1 time refill only due to:  Future labs ordered TSH.     lovastatin (MEVACOR) 20 MG tablet  Rx was sent 11/09/2018 for 90 tabs and 1 refills.   Pharmacy notified via E-prescribe refusal.  Beryl Ames RN, BSN      metFORMIN (GLUCOPHAGE) 500 MG tablet  Rx was sent 11/09/2018 for 180 tabs and 1 refills.   Pharmacy notified via E-prescribe refusal.  Beryl Ames RN, BSN

## 2019-02-01 NOTE — TELEPHONE ENCOUNTER
"Requested Prescriptions   Pending Prescriptions Disp Refills     levothyroxine (SYNTHROID/LEVOTHROID) 75 MCG tablet [Pharmacy Med Name: LEVOTHYROXINE SODIUM 75MCG TABS] 90 tablet 2     Sig: TAKE ONE TABLET BY MOUTH EVERY MORNING    Thyroid Protocol Passed - 1/31/2019  9:11 PM       Passed - Patient is 12 years or older       Passed - Recent (12 mo) or future (30 days) visit within the authorizing provider's specialty    Patient had office visit in the last 12 months or has a visit in the next 30 days with authorizing provider or within the authorizing provider's specialty.  See \"Patient Info\" tab in inbasket, or \"Choose Columns\" in Meds & Orders section of the refill encounter.         Passed - Medication is active on med list       Passed - Normal TSH on file in past 12 months    Recent Labs   Lab Test 02/15/18  1112   TSH 1.66          Passed - No active pregnancy on record    If patient is pregnant or has had a positive pregnancy test, please check TSH.       Passed - No positive pregnancy test in past 12 months    If patient is pregnant or has had a positive pregnancy test, please check TSH.        metFORMIN (GLUCOPHAGE) 500 MG tablet [Pharmacy Med Name: METFORMIN HCL 500MG TABS] 60 tablet 0     Sig: TAKE ONE TABLET BY MOUTH TWICE A DAY WITH MEALS    Biguanide Agents Passed - 1/31/2019  9:11 PM       Passed - Blood pressure less than 140/90 in past 6 months    BP Readings from Last 3 Encounters:   01/09/19 132/88   01/03/19 114/76   11/09/18 131/84          Passed - Patient has documented LDL within the past 12 mos.    Recent Labs   Lab Test 05/09/18  0826   LDL 60          Passed - Patient has had a Microalbumin in the past 15 mos.    Recent Labs   Lab Test 11/09/18  1259   MICROL 6   UMALCR 7.60          Passed - Patient is age 10 or older       Passed - Patient has documented A1c within the specified period of time.    If HgbA1C is 8 or greater, it needs to be on file within the past 3 months.  If less than " "8, must be on file within the past 6 months.     Recent Labs   Lab Test 11/09/18  1249   A1C 6.0*          Passed - Patient's CR is NOT>1.4 OR Patient's EGFR is NOT<45 within past 12 mos.    Recent Labs   Lab Test 11/09/18  1249   GFRESTIMATED >90   GFRESTBLACK >90     Recent Labs   Lab Test 11/09/18  1249   CR 0.64          Passed - Patient does NOT have a diagnosis of CHF.       Passed - Medication is active on med list       Passed - Patient is not pregnant       Passed - Patient has not had a positive pregnancy test within the past 12 mos.        Passed - Recent (6 mo) or future (30 days) visit within the authorizing provider's specialty    Patient had office visit in the last 6 months or has a visit in the next 30 days with authorizing provider or within the authorizing provider's specialty.  See \"Patient Info\" tab in inbasket, or \"Choose Columns\" in Meds & Orders section of the refill encounter.            lovastatin (MEVACOR) 20 MG tablet [Pharmacy Med Name: LOVASTATIN 20MG TABS] 30 tablet 0     Sig: TAKE 1 TABLET (20 MG) BY MOUTH AT BEDTIME    Statins Protocol Passed - 1/31/2019  9:11 PM       Passed - LDL on file in past 12 months    Recent Labs   Lab Test 05/09/18  0826   LDL 60          Passed - No abnormal creatine kinase in past 12 months    No lab results found.        Passed - Recent (12 mo) or future (30 days) visit within the authorizing provider's specialty    Patient had office visit in the last 12 months or has a visit in the next 30 days with authorizing provider or within the authorizing provider's specialty.  See \"Patient Info\" tab in [employer], or \"Choose Columns\" in Meds & Orders section of the refill encounter.         Passed - Medication is active on med list       Passed - Patient is age 18 or older       Passed - No active pregnancy on record       Passed - No positive pregnancy test in past 12 months        levothyroxine (SYNTHROID/LEVOTHROID) 75 MCG tablet  Medication is being filled " for 1 time refill only due to:  Future labs ordered TSH.    lovastatin (MEVACOR) 20 MG tablet  Rx was sent 11/09/2018 for 90 tabs and 1 refills.   Pharmacy notified via E-prescribe refusal.  Beryl Ames RN, BSN     metFORMIN (GLUCOPHAGE) 500 MG tablet  Rx was sent 11/09/2018 for 180 tabs and 1 refills.   Pharmacy notified via E-prescribe refusal.  Beryl Ames RN, BSN

## 2019-02-05 NOTE — TELEPHONE ENCOUNTER
LM for patient to return phone call to clinic about message below.  Peggy Mustafa CMA ([facility])

## 2019-03-03 ENCOUNTER — TELEPHONE (OUTPATIENT)
Dept: FAMILY MEDICINE | Facility: OTHER | Age: 69
End: 2019-03-03

## 2019-03-03 DIAGNOSIS — E11.9 TYPE 2 DIABETES MELLITUS WITHOUT COMPLICATION, WITHOUT LONG-TERM CURRENT USE OF INSULIN (H): ICD-10-CM

## 2019-03-03 DIAGNOSIS — E03.9 HYPOTHYROIDISM, UNSPECIFIED TYPE: ICD-10-CM

## 2019-03-03 DIAGNOSIS — E78.5 HYPERLIPIDEMIA LDL GOAL <130: ICD-10-CM

## 2019-03-03 DIAGNOSIS — M81.0 OSTEOPOROSIS, UNSPECIFIED OSTEOPOROSIS TYPE, UNSPECIFIED PATHOLOGICAL FRACTURE PRESENCE: ICD-10-CM

## 2019-03-04 RX ORDER — LOVASTATIN 20 MG
TABLET ORAL
Qty: 90 TABLET | Refills: 0 | Status: SHIPPED | OUTPATIENT
Start: 2019-03-04 | End: 2019-03-15

## 2019-03-05 NOTE — TELEPHONE ENCOUNTER
"Requested Prescriptions   Pending Prescriptions Disp Refills     levothyroxine (SYNTHROID/LEVOTHROID) 75 MCG tablet [Pharmacy Med Name: LEVOTHYROXINE SODIUM 75MCG TABS] 30 tablet 0     Sig: TAKE ONE TABLET BY MOUTH EVERY MORNING (OK 1 TIME ONLY DUE FOR FUTURE LABS ORDERED TSH )    Thyroid Protocol Failed - 3/3/2019  7:43 PM       Failed - Normal TSH on file in past 12 months    Recent Labs   Lab Test 02/15/18  1112   TSH 1.66             Passed - Patient is 12 years or older       Passed - Recent (12 mo) or future (30 days) visit within the authorizing provider's specialty    Patient had office visit in the last 12 months or has a visit in the next 30 days with authorizing provider or within the authorizing provider's specialty.  See \"Patient Info\" tab in inbasket, or \"Choose Columns\" in Meds & Orders section of the refill encounter.             Passed - Medication is active on med list       Passed - No active pregnancy on record    If patient is pregnant or has had a positive pregnancy test, please check TSH.         Passed - No positive pregnancy test in past 12 months    If patient is pregnant or has had a positive pregnancy test, please check TSH.          calcitonin, salmon, (MIACALCIN) 200 UNIT/ACT nasal spray [Pharmacy Med Name: CALCITONIN (SALMON) 200 SOLN] 3.7 mL 9     Sig: USE ONE SPRAY INTO ONE NOSTRIL DAILY **ALTERNATE NOSTRIL DAILY.    Bisphosphonates Failed - 3/3/2019  7:43 PM       Failed - Medication is active on med list       Passed - Recent (12 mo) or future (30 days) visit within the authorizing provider's specialty    Patient had office visit in the last 12 months or has a visit in the next 30 days with authorizing provider or within the authorizing provider's specialty.  See \"Patient Info\" tab in inbasket, or \"Choose Columns\" in Meds & Orders section of the refill encounter.             Passed - Dexa on file within past 2 years    Please review last Dexa result.          Passed - Patient is " "age 18 or older       Passed - Normal serum creatinine on file within past 12 months    Recent Labs   Lab Test 11/09/18  1249   CR 0.64             metFORMIN (GLUCOPHAGE) 500 MG tablet [Pharmacy Med Name: METFORMIN HCL 500MG TABS] 60 tablet 0     Sig: TAKE ONE TABLET BY MOUTH TWICE A DAY WITH MEALS    Biguanide Agents Passed - 3/3/2019  7:43 PM       Passed - Blood pressure less than 140/90 in past 6 months    BP Readings from Last 3 Encounters:   01/09/19 132/88   01/03/19 114/76   11/09/18 131/84                Passed - Patient has documented LDL within the past 12 mos.    Recent Labs   Lab Test 05/09/18  0826   LDL 60            Passed - Patient has had a Microalbumin in the past 15 mos.    Recent Labs   Lab Test 11/09/18  1259   MICROL 6   UMALCR 7.60            Passed - Patient is age 10 or older       Passed - Patient has documented A1c within the specified period of time.    If HgbA1C is 8 or greater, it needs to be on file within the past 3 months.  If less than 8, must be on file within the past 6 months.     Recent Labs   Lab Test 11/09/18  1249   A1C 6.0*            Passed - Patient's CR is NOT>1.4 OR Patient's EGFR is NOT<45 within past 12 mos.    Recent Labs   Lab Test 11/09/18  1249   GFRESTIMATED >90   GFRESTBLACK >90       Recent Labs   Lab Test 11/09/18  1249   CR 0.64            Passed - Patient does NOT have a diagnosis of CHF.       Passed - Medication is active on med list       Passed - Patient is not pregnant       Passed - Patient has not had a positive pregnancy test within the past 12 mos.        Passed - Recent (6 mo) or future (30 days) visit within the authorizing provider's specialty    Patient had office visit in the last 6 months or has a visit in the next 30 days with authorizing provider or within the authorizing provider's specialty.  See \"Patient Info\" tab in inbasket, or \"Choose Columns\" in Meds & Orders section of the refill encounter.            lovastatin (MEVACOR) 20 MG " "tablet [Pharmacy Med Name: LOVASTATIN 20MG TABS] 30 tablet 0     Sig: TAKE 1 TABLET (20 MG) BY MOUTH AT BEDTIME    Statins Protocol Passed - 3/3/2019  7:43 PM       Passed - LDL on file in past 12 months    Recent Labs   Lab Test 05/09/18  0826   LDL 60            Passed - No abnormal creatine kinase in past 12 months    No lab results found.            Passed - Recent (12 mo) or future (30 days) visit within the authorizing provider's specialty    Patient had office visit in the last 12 months or has a visit in the next 30 days with authorizing provider or within the authorizing provider's specialty.  See \"Patient Info\" tab in inbasket, or \"Choose Columns\" in Meds & Orders section of the refill encounter.             Passed - Medication is active on med list       Passed - Patient is age 18 or older       Passed - No active pregnancy on record       Passed - No positive pregnancy test in past 12 months          Pres[place] [facility] [place]    Due for TSH labs. SANTOS was already given for levothryoxine  calcitonin, salmon, (MIACALCIN) 200 UNIT/ACT nasal spray is not on med list  "

## 2019-03-06 RX ORDER — CALCITONIN SALMON 200 [IU]/.09ML
SPRAY, METERED NASAL
Qty: 3.7 ML | Refills: 9 | OUTPATIENT
Start: 2019-03-06

## 2019-03-06 RX ORDER — LEVOTHYROXINE SODIUM 75 UG/1
TABLET ORAL
Qty: 30 TABLET | Refills: 0 | OUTPATIENT
Start: 2019-03-06

## 2019-03-06 NOTE — TELEPHONE ENCOUNTER
Left message via  services for patient to return call. Please assist in scheduling physical.   Erlinda Harvey CMA

## 2019-03-08 NOTE — PROGRESS NOTES
"  SUBJECTIVE:   Vero Scott is a 68 year old female who presents to clinic today for the following health issues:  {Provider please address medication reconciliation discrepancies--rooming staff please delete if no med/rec issues}    HPI  {additional problems for roomer to add, delete if none:221231}  Problem list and histories reviewed & adjusted, as indicated.  Additional history: {NONE - AS DOCUMENTED:306658::\"as documented\"}    {ACUTE Problem SUPERLIST - brief histories:930970}    {HIST REVIEW/ LINKS 2:488859}    {PROVIDER CHARTING PREFERENCE:316212}  "

## 2019-03-15 ENCOUNTER — OFFICE VISIT (OUTPATIENT)
Dept: FAMILY MEDICINE | Facility: OTHER | Age: 69
End: 2019-03-15
Payer: COMMERCIAL

## 2019-03-15 VITALS
SYSTOLIC BLOOD PRESSURE: 123 MMHG | BODY MASS INDEX: 33.86 KG/M2 | RESPIRATION RATE: 16 BRPM | OXYGEN SATURATION: 100 % | WEIGHT: 184 LBS | TEMPERATURE: 98 F | HEART RATE: 85 BPM | HEIGHT: 62 IN | DIASTOLIC BLOOD PRESSURE: 82 MMHG

## 2019-03-15 DIAGNOSIS — F41.9 ANXIETY: Primary | ICD-10-CM

## 2019-03-15 DIAGNOSIS — E11.9 TYPE 2 DIABETES MELLITUS WITHOUT COMPLICATION, WITHOUT LONG-TERM CURRENT USE OF INSULIN (H): ICD-10-CM

## 2019-03-15 DIAGNOSIS — E03.9 HYPOTHYROIDISM, UNSPECIFIED TYPE: ICD-10-CM

## 2019-03-15 LAB
HBA1C MFR BLD: 6.2 % (ref 0–5.6)
TSH SERPL DL<=0.005 MIU/L-ACNC: 0.52 MU/L (ref 0.4–4)

## 2019-03-15 PROCEDURE — 99214 OFFICE O/P EST MOD 30 MIN: CPT | Performed by: FAMILY MEDICINE

## 2019-03-15 PROCEDURE — 36415 COLL VENOUS BLD VENIPUNCTURE: CPT | Performed by: FAMILY MEDICINE

## 2019-03-15 PROCEDURE — 83036 HEMOGLOBIN GLYCOSYLATED A1C: CPT | Performed by: FAMILY MEDICINE

## 2019-03-15 PROCEDURE — 84443 ASSAY THYROID STIM HORMONE: CPT | Performed by: FAMILY MEDICINE

## 2019-03-15 RX ORDER — SERTRALINE HYDROCHLORIDE 25 MG/1
25 TABLET, FILM COATED ORAL DAILY
Qty: 90 TABLET | Refills: 1 | Status: SHIPPED | OUTPATIENT
Start: 2019-03-15

## 2019-03-15 RX ORDER — LEVOTHYROXINE SODIUM 75 UG/1
75 TABLET ORAL EVERY MORNING
Qty: 90 TABLET | Refills: 3 | Status: SHIPPED | OUTPATIENT
Start: 2019-03-15

## 2019-03-15 ASSESSMENT — ANXIETY QUESTIONNAIRES
GAD7 TOTAL SCORE: 6
1. FEELING NERVOUS, ANXIOUS, OR ON EDGE: SEVERAL DAYS
3. WORRYING TOO MUCH ABOUT DIFFERENT THINGS: SEVERAL DAYS
5. BEING SO RESTLESS THAT IT IS HARD TO SIT STILL: NOT AT ALL
4. TROUBLE RELAXING: SEVERAL DAYS
2. NOT BEING ABLE TO STOP OR CONTROL WORRYING: SEVERAL DAYS
7. FEELING AFRAID AS IF SOMETHING AWFUL MIGHT HAPPEN: SEVERAL DAYS
GAD7 TOTAL SCORE: 6
GAD7 TOTAL SCORE: 6
6. BECOMING EASILY ANNOYED OR IRRITABLE: SEVERAL DAYS
7. FEELING AFRAID AS IF SOMETHING AWFUL MIGHT HAPPEN: SEVERAL DAYS

## 2019-03-15 ASSESSMENT — PATIENT HEALTH QUESTIONNAIRE - PHQ9
10. IF YOU CHECKED OFF ANY PROBLEMS, HOW DIFFICULT HAVE THESE PROBLEMS MADE IT FOR YOU TO DO YOUR WORK, TAKE CARE OF THINGS AT HOME, OR GET ALONG WITH OTHER PEOPLE: SOMEWHAT DIFFICULT
SUM OF ALL RESPONSES TO PHQ QUESTIONS 1-9: 6
SUM OF ALL RESPONSES TO PHQ QUESTIONS 1-9: 6

## 2019-03-15 ASSESSMENT — ENCOUNTER SYMPTOMS
MUSCULOSKELETAL NEGATIVE: 1
HEMATOLOGIC/LYMPHATIC NEGATIVE: 1
ALLERGIC/IMMUNOLOGIC NEGATIVE: 1
GASTROINTESTINAL NEGATIVE: 1
FATIGUE: 1
ENDOCRINE NEGATIVE: 1
EYES NEGATIVE: 1
NERVOUS/ANXIOUS: 1
NEUROLOGICAL NEGATIVE: 1
CARDIOVASCULAR NEGATIVE: 1
SHORTNESS OF BREATH: 1

## 2019-03-15 ASSESSMENT — PAIN SCALES - GENERAL: PAINLEVEL: NO PAIN (0)

## 2019-03-15 ASSESSMENT — MIFFLIN-ST. JEOR: SCORE: 1324.7

## 2019-03-15 NOTE — PROGRESS NOTES
SUBJECTIVE:   Vero Scott is a 68 year old female who presents for Preventive Visit.      Are you in the first 12 months of your Medicare coverage?  No    History of Present Illness     Diabetes:     Frequency of checking blood sugars::  2 times a day    Diabetic concerns::  None    Hypoglycemia symptoms::  None    Paraesthesia present::  No    Eye Exam in the last year::  Yes    6 month ago    Diabetes Management Resources        Social History     Tobacco Use     Smoking status: Never Smoker     Smokeless tobacco: Never Used     Tobacco comment: no smokers in household   Substance Use Topics     Alcohol use: Yes     Comment: occ       Alcohol Use 11/9/2018   If you drink alcohol do you typically have greater than 3 drinks per day OR greater than 7 drinks per week? No       The following health maintenance items are reviewed in Epic and correct as of today:  Health Maintenance   Topic Date Due     ADVANCE DIRECTIVE PLANNING Q5 YRS  06/21/2016     NIGHAT QUESTIONNAIRE 1 YEAR  03/30/2017     ZOSTER IMMUNIZATION (3 of 3) 01/04/2019     TSH Q1 YEAR  02/15/2019     FOOT EXAM Q1 YEAR  05/07/2019     A1C Q6 MO  05/09/2019     LIPID MONITORING Q1 YEAR  05/09/2019     EYE EXAM Q1 YEAR  10/25/2019     DTAP/TDAP/TD IMMUNIZATION (2 - Td) 11/02/2019     CMP Q1 YR  11/09/2019     MEDICARE ANNUAL WELLNESS VISIT  11/09/2019     FALL RISK ASSESSMENT  11/09/2019     MICROALBUMIN Q1 YEAR  11/09/2019     PHQ-2 Q1 YR  11/09/2019     TSH W/ FREE T4 REFLEX Q2 YEAR  02/15/2020     MAMMO SCREEN Q2 YR (SYSTEM ASSIGNED)  11/21/2020     COLON CANCER SCREEN (SYSTEM ASSIGNED)  04/10/2025     DEXA SCAN SCREENING (SYSTEM ASSIGNED)  Completed     INFLUENZA VACCINE  Completed     HEPATITIS C SCREENING  Completed     IPV IMMUNIZATION  Aged Out     MENINGITIS IMMUNIZATION  Aged Out     Labs reviewed in EPIC  BP Readings from Last 3 Encounters:   03/15/19 123/82   01/09/19 132/88   01/03/19 114/76    Wt Readings from Last 3 Encounters:   03/15/19 83.5  kg (184 lb)   01/09/19 84.8 kg (187 lb)   01/03/19 84.4 kg (186 lb)                  Patient Active Problem List   Diagnosis     Esophageal reflux     Postmenopausal atrophic vaginitis     Nonspecific reaction to tuberculin skin test without active tuberculosis     Anxiety state     Other osteoporosis     Hypothyroidism     Adenomatous polyp of colon     Hyperlipidemia LDL goal <130     Advanced directives, counseling/discussion     Type 2 diabetes mellitus with other specified complication, without long-term current use of insulin (H)     Obstructive sleep apnea     Patellofemoral disorder of right knee     Rotator cuff syndrome     Pulmonary nodule     Obesity, Class II, BMI 35-39.9     Female stress incontinence     Plantar fasciitis     Bloating     Ventral hernia without obstruction or gangrene     Pelvic pain     Past Surgical History:   Procedure Laterality Date     C MAMMOGRAM, SCREENING  2003, 2002     C TOTAL ABDOM HYSTERECTOMY  2002    with BSO, for cervical dysplasia, no malignancy on biopsy     COLONOSCOPY  11/10/2010    COMBINED COLONOSCOPY, REMOVE TUMOR/POLYP/LESION BY SNARE performed by JOHNATHON BHATT at  GI, recheck in 3 years     COLONOSCOPY N/A 4/10/2015    Procedure: COMBINED COLONOSCOPY, SINGLE OR MULTIPLE BIOPSY/POLYPECTOMY BY BIOPSY;  Surgeon: Johnathon Bhatt MD;  Location: [facility]     COMBINED ESOPHAGOSCOPY, GASTROSCOPY, DUODENOSCOPY (EGD) WITH CO2 INSUFFLATION Bilateral 6/22/2016    Procedure: COMBINED ESOPHAGOSCOPY, GASTROSCOPY, DUODENOSCOPY (EGD) WITH CO2 INSUFFLATION;  Surgeon: Duane, William Charles, MD;  Location: [facility] OR     CONIZATION CERVIX,KNIFE/LASER  2002    CIS     ESOPHAGOSCOPY, GASTROSCOPY, DUODENOSCOPY (EGD), COMBINED N/A 6/22/2016    Procedure: COMBINED ESOPHAGOSCOPY, GASTROSCOPY, DUODENOSCOPY (EGD), BIOPSY SINGLE OR MULTIPLE;  Surgeon: Duane, William Charles, MD;  Location: [facility] OR     HC COLONOSCOPY THRU STOMA W BIOPSY/CAUTERY TUMOR/POLYP/LESION  1/10/2006    adenomatous,  recheck in 5 years      UGI ENDOSCOPY DIAG W BIOPSY  11/22/05      UGI ENDOSCOPY, SIMPLE EXAM  12/03    abnormal     HCL PAP SMEAR  2002     HYSTERECTOMY, PAP NO LONGER INDICATED       SLING TRANSVAGINAL N/A 3/31/2016    Procedure: SLING TRANSVAGINAL;  Surgeon: González Wright MD;  Location:  OR       Social History     Tobacco Use     Smoking status: Never Smoker     Smokeless tobacco: Never Used     Tobacco comment: no smokers in household   Substance Use Topics     Alcohol use: Yes     Comment: occ     Family History   Problem Relation Age of Onset     Cancer No family hx of         breast or colon     Diabetes No family hx of      Hypertension No family hx of      Heart Disease No family hx of         CAD     Breast Cancer No family hx of      Depression No family hx of      Substance Abuse No family hx of      Anesthesia Reaction No family hx of      Genetic Disorder No family hx of          Current Outpatient Medications   Medication Sig Dispense Refill     aspirin 81 MG EC tablet TAKE ONE TABLET BY MOUTH EVERY  tablet 2     ANGELA CONTOUR test strip TEST DAILY AS DIRECTED 50 each 11     ANGELA CONTOUR test strip TEST DAILY AS DIRECTED 50 each 3     ANGELA CONTOUR test strip TEST DAILY AS DIRECTED 50 each 3     blood glucose monitoring (ANGELA MICROLET) lancets TEST DAILY 100 each 5     blood glucose monitoring (ANGELA MICROLET) lancets TEST DAILY 100 each 5     blood glucose monitoring (ANGELA MICROLET) lancets TEST DAILY 100 each 5     blood glucose monitoring (NO BRAND SPECIFIED) meter device kit Use to test blood sugar one  times daily or as directed. 1 kit 0     blood glucose monitoring (NO BRAND SPECIFIED) test strip Use to test blood sugars one  times daily or as directed 90 each 3     Blood Pressure KIT Check blood pressures weekly 1 kit 0     Cholecalciferol (VITAMIN D3 PO) Take 600 Units by mouth daily       levothyroxine (SYNTHROID/LEVOTHROID) 75 MCG tablet Take 1 tablet (75 mcg) by mouth  every morning 90 tablet 3     lovastatin (MEVACOR) 20 MG tablet Take 1 tablet (20 mg) by mouth At Bedtime 90 tablet 1     metFORMIN (GLUCOPHAGE) 500 MG tablet TAKE ONE TABLET BY MOUTH TWICE A DAY WITH MEALS 180 tablet 0     metFORMIN (GLUCOPHAGE) 500 MG tablet Take 1 tablet (500 mg) by mouth 2 times daily (with meals) 180 tablet 1     order for DME Equipment being ordered: super feet size c 1 Device 0     ranitidine (ZANTAC) 150 MG tablet Take 1 tablet (150 mg) by mouth 2 times daily 180 tablet 1     sertraline (ZOLOFT) 25 MG tablet Take 1 tablet (25 mg) by mouth daily 90 tablet 1     Allergies   Allergen Reactions     Sulfa Drugs      n&v     Recent Labs   Lab Test 03/15/19  1151 11/09/18  1249 05/09/18  0826 02/15/18  1112 09/07/17  1220 03/22/17  1100 08/23/16  0943 02/09/16  1324   A1C 6.2* 6.0* 6.1* 6.3* 6.0 6.3* 6.2* 6.4*   LDL  --   --  60  --   --  82  --  76   HDL  --   --  58  --   --  62  --  61   TRIG  --   --  77  --   --  82  --  73   ALT  --  43  --   --  28  --  31  --    CR  --  0.64  --  0.63 0.70  --  0.66 0.67   GFRESTIMATED  --  >90  --  >90 84  --  >90  Non  GFR Calc   88   GFRESTBLACK  --  >90  --  >90 >90  --  >90   GFR Calc   >90   GFR Calc     POTASSIUM  --  4.0  --  3.8 3.9  --  4.2  --    TSH 0.52  --   --  1.66  --  1.87  --  3.79        Review of Systems   Constitutional: Positive for fatigue.   HENT: Negative.    Eyes: Negative.    Respiratory: Positive for shortness of breath.    Cardiovascular: Negative.    Gastrointestinal: Negative.    Endocrine: Negative.    Breasts:  negative.    Genitourinary: Negative.    Musculoskeletal: Negative.    Skin: Negative.    Allergic/Immunologic: Negative.    Neurological: Negative.    Hematological: Negative.    Psychiatric/Behavioral: The patient is nervous/anxious.        OBJECTIVE:   There were no vitals taken for this visit. Estimated body mass index is 34.42 kg/m  as calculated from the  "following:    Height as of 1/3/19: 1.57 m (5' 1.8\").    Weight as of 1/9/19: 84.8 kg (187 lb).  Physical Exam   Constitutional: She appears well-developed and well-nourished.   HENT:   Head: Normocephalic and atraumatic.   Cardiovascular: Normal rate, regular rhythm and normal heart sounds.   Pulmonary/Chest: Effort normal and breath sounds normal.   Psychiatric: She has a normal mood and affect. Her behavior is normal. Judgment and thought content normal.         Diagnostic Test Results:  none     ASSESSMENT / PLAN:   1. Hypothyroidism, unspecified type  -Recheck TSH.  We will adjust the dose based on the results.  - TSH WITH FREE T4 REFLEX  - levothyroxine (SYNTHROID/LEVOTHROID) 75 MCG tablet; Take 1 tablet (75 mcg) by mouth every morning  Dispense: 90 tablet; Refill: 3    2. Type 2 diabetes mellitus without complication, without long-term current use of insulin (H)  Update hemoglobin A1c.  - Hemoglobin A1c    3. Anxiety  Trial of Zoloft to help with anxiety.  - sertraline (ZOLOFT) 25 MG tablet; Take 1 tablet (25 mg) by mouth daily  Dispense: 90 tablet; Refill: 1      Marie Warner MD  [facility]  "

## 2019-03-16 ASSESSMENT — ANXIETY QUESTIONNAIRES: GAD7 TOTAL SCORE: 6

## 2019-03-27 ENCOUNTER — TELEPHONE (OUTPATIENT)
Dept: FAMILY MEDICINE | Facility: OTHER | Age: 69
End: 2019-03-27

## 2019-03-27 DIAGNOSIS — E78.5 HYPERLIPIDEMIA LDL GOAL <130: ICD-10-CM

## 2019-03-27 DIAGNOSIS — F41.9 ANXIETY: ICD-10-CM

## 2019-03-27 DIAGNOSIS — K21.9 GASTROESOPHAGEAL REFLUX DISEASE, ESOPHAGITIS PRESENCE NOT SPECIFIED: ICD-10-CM

## 2019-03-27 DIAGNOSIS — E11.9 TYPE 2 DIABETES MELLITUS WITHOUT COMPLICATION, WITHOUT LONG-TERM CURRENT USE OF INSULIN (H): ICD-10-CM

## 2019-03-27 DIAGNOSIS — R73.01 IMPAIRED FASTING GLUCOSE: ICD-10-CM

## 2019-03-27 DIAGNOSIS — M81.0 OSTEOPOROSIS: ICD-10-CM

## 2019-03-27 RX ORDER — ASPIRIN 81 MG/1
TABLET, COATED ORAL
Qty: 120 TABLET | Refills: 2 | Status: SHIPPED | OUTPATIENT
Start: 2019-03-27

## 2019-03-27 RX ORDER — SERTRALINE HYDROCHLORIDE 25 MG/1
TABLET, FILM COATED ORAL
Qty: 90 TABLET | Refills: 1 | OUTPATIENT
Start: 2019-03-27

## 2019-03-27 NOTE — TELEPHONE ENCOUNTER
Metformin    Sent 3/4/2019 with 3 month supply. Refill not appropriate at this time.     Ranitidine    Sent 11/9/2018 with 6 month supply. Refill not appropriate at this time.     Aspirin 81mg    Prescription [place] per [facility] [place].    Sertraline    Sent 3/15/2019 with 6 month supply. Refill not appropriate at this time.     Lancets    Prescription approved per [facility] [place].    Ann Preciado, RN, BSN

## 2019-05-06 DIAGNOSIS — E11.9 TYPE 2 DIABETES MELLITUS WITHOUT COMPLICATION, WITHOUT LONG-TERM CURRENT USE OF INSULIN (H): ICD-10-CM

## 2019-05-06 DIAGNOSIS — E78.5 HYPERLIPIDEMIA LDL GOAL <130: ICD-10-CM

## 2019-05-08 RX ORDER — LOVASTATIN 20 MG
TABLET ORAL
Qty: 30 TABLET | Refills: 0 | Status: SHIPPED | OUTPATIENT
Start: 2019-05-08

## 2019-05-08 NOTE — TELEPHONE ENCOUNTER
"Requested Prescriptions   Pending Prescriptions Disp Refills     metFORMIN (GLUCOPHAGE) 500 MG tablet [Pharmacy Med Name: METFORMIN HCL 500MG TABS] 60 tablet 0     Sig: TAKE ONE TABLET BY MOUTH TWICE A DAY WITH MEALS       Biguanide Agents Passed - 5/6/2019  1:40 PM        Passed - Blood pressure less than 140/90 in past 6 months     BP Readings from Last 3 Encounters:   03/15/19 123/82   01/09/19 132/88   01/03/19 114/76           Passed - Patient has documented LDL within the past 12 mos.     Recent Labs   Lab Test 05/09/18  0826   LDL 60           Passed - Patient has had a Microalbumin in the past 15 mos.     Recent Labs   Lab Test 11/09/18  1259   MICROL 6   UMALCR 7.60           Passed - Patient is age 10 or older        Passed - Patient has documented A1c within the specified period of time.     If HgbA1C is 8 or greater, it needs to be on file within the past 3 months.  If less than 8, must be on file within the past 6 months.     Recent Labs   Lab Test 03/15/19  1151   A1C 6.2*             Passed - Patient's CR is NOT>1.4 OR Patient's EGFR is NOT<45 within past 12 mos.     Recent Labs   Lab Test 11/09/18  1249   GFRESTIMATED >90   GFRESTBLACK >90     Recent Labs   Lab Test 11/09/18  1249   CR 0.64           Passed - Patient does NOT have a diagnosis of CHF.        Passed - Medication is active on med list        Passed - Patient is not pregnant        Passed - Patient has not had a positive pregnancy test within the past 12 mos.         Passed - Recent (6 mo) or future (30 days) visit within the authorizing provider's specialty     Patient had office visit in the last 6 months or has a visit in the next 30 days with authorizing provider or within the authorizing provider's specialty.  See \"Patient Info\" tab in inbasket, or \"Choose Columns\" in Meds & Orders section of the refill encounter.            lovastatin (MEVACOR) 20 MG tablet [Pharmacy Med Name: LOVASTATIN 20MG TABS] 30 tablet 0     Sig: TAKE 1 " "TABLET (20 MG) BY MOUTH AT BEDTIME       Statins Protocol Passed - 5/6/2019  1:40 PM        Passed - LDL on file in past 12 months     Recent Labs   Lab Test 05/09/18  0826   LDL 60             Passed - No abnormal creatine kinase in past 12 months     No lab results found.             Passed - Recent (12 mo) or future (30 days) visit within the authorizing provider's specialty     Patient had office visit in the last 12 months or has a visit in the next 30 days with authorizing provider or within the authorizing provider's specialty.  See \"Patient Info\" tab in inbasket, or \"Choose Columns\" in Meds & Orders section of the refill encounter.              Passed - Medication is active on med list        Passed - Patient is age 18 or older        Passed - No active pregnancy on record        Passed - No positive pregnancy test in past 12 months        Prescription approved per [facility] [place].    "

## 2019-05-20 NOTE — TELEPHONE ENCOUNTER
Devon Lancets      Last Written Prescription Date: 04/06/2016  Last Fill Quantity: 1 box,  # refills: 6   Last Office Visit with [facility]  [facility] prescribing provider: 09/13/2016    Calcitonin 200    Last Written Prescription Date: 12/27/2016  Last Fill Quantity: 3.7, # refills: 1  Last Office Visit with [facility], [facility] or  [place] prescribing provider: 09/13/2016       DEXA Scan:  Last order of DX HIP/PELVIS/SPINE was found on 9/12/2014 from Hospital Encounter on 9/12/2014     No order of DX HIP/PELVIS/SPINE W LAT FRACTION ANALYSIS is found.       CREATININE   Date Value Ref Range Status   08/23/2016 0.66 0.52 - 1.04 mg/dL Final       Devon Contour test Strips      Last Written Prescription Date: 12/2/2016  Last Fill Quantity: 50,  # refills: 2   Last Office Visit with [facility], [facility] [place] or  [place] prescribing provider: 09/13/2016                                                                                                  Detail Level: Detailed Introduction Text (Please End With A Colon): The following procedure was deferred: Other Procedure: liquid nitrogen

## 2019-06-30 DIAGNOSIS — E11.9 TYPE 2 DIABETES MELLITUS WITHOUT COMPLICATION, WITHOUT LONG-TERM CURRENT USE OF INSULIN (H): ICD-10-CM

## 2019-06-30 DIAGNOSIS — E78.5 HYPERLIPIDEMIA LDL GOAL <130: ICD-10-CM

## 2019-06-30 DIAGNOSIS — K21.9 GASTROESOPHAGEAL REFLUX DISEASE, ESOPHAGITIS PRESENCE NOT SPECIFIED: ICD-10-CM

## 2019-06-30 DIAGNOSIS — M81.0 OSTEOPOROSIS, UNSPECIFIED OSTEOPOROSIS TYPE, UNSPECIFIED PATHOLOGICAL FRACTURE PRESENCE: ICD-10-CM

## 2019-06-30 NOTE — LETTER
[facility]  [address]   [place] [facility] [address]  Phone: [phone]    07/05/19    Vero Scott  [address]  [place] [address]        Vero,    We have recently received refill requests for the following medications:  Lovastatin, Aspirin, Ranitidine, Miacalcin and Metformin.    We have sent refills for you, in order to get more refills we would like you to come in and have a fasting cholesterol panel done.     Please call us at [phone] to assist with scheduling your lab work.    Sincerely,  Your [facility] Care Team

## 2019-07-02 RX ORDER — ASPIRIN 81 MG/1
TABLET, COATED ORAL
Qty: 120 TABLET | Refills: 2 | Status: SHIPPED | OUTPATIENT
Start: 2019-07-02

## 2019-07-02 RX ORDER — CALCITONIN SALMON 200 [IU]/.09ML
SPRAY, METERED NASAL
Qty: 3.7 ML | Refills: 9 | Status: SHIPPED | OUTPATIENT
Start: 2019-07-02

## 2019-07-02 RX ORDER — LOVASTATIN 20 MG
TABLET ORAL
Qty: 90 TABLET | Refills: 0 | Status: SHIPPED | OUTPATIENT
Start: 2019-07-02

## 2019-07-02 NOTE — TELEPHONE ENCOUNTER
Lovastatin    Routing refill request to provider for review/approval because:  Labs not current:  Lipids   break in medication    Aspirin    Sent 3/27/2019 with 1 year supply. Refill not appropriate at this time.     Miacalcin    Routing refill request to provider for review/approval because:  Drug not active on patient's medication list      Ranitidine    Routing refill request to provider for review/approval because:  A break in medication    Metformin    Routing refill request to provider for review/approval because:  Labs not current:  Lipids  A break in medication    LOV 3/15/2019, return in 6 months    Ann Preciado, RN, BSN

## 2019-11-04 ENCOUNTER — HEALTH MAINTENANCE LETTER (OUTPATIENT)
Age: 69
End: 2019-11-04

## 2020-01-06 ENCOUNTER — TELEPHONE (OUTPATIENT)
Dept: FAMILY MEDICINE | Facility: OTHER | Age: 70
End: 2020-01-06

## 2020-01-06 NOTE — TELEPHONE ENCOUNTER
Panel Management Review      Patient has the following on her problem list:     Diabetes    ASA: Passed    Last A1C  Lab Results   Component Value Date    A1C 6.2 03/15/2019    A1C 6.0 11/09/2018    A1C 6.1 05/09/2018    A1C 6.3 02/15/2018    A1C 6.0 09/07/2017     A1C tested: Passed    Last LDL:    Lab Results   Component Value Date    CHOL 133 05/09/2018     Lab Results   Component Value Date    HDL 58 05/09/2018     Lab Results   Component Value Date    LDL 60 05/09/2018     Lab Results   Component Value Date    TRIG 77 05/09/2018     Lab Results   Component Value Date    CHOLHDLRATIO 2.3 09/09/2014     Lab Results   Component Value Date    NHDL 75 05/09/2018       Is the patient on a Statin? YES             Is the patient on Aspirin? YES    Medications     HMG CoA Reductase Inhibitors     lovastatin (MEVACOR) 20 MG tablet        lovastatin (MEVACOR) 20 MG tablet        lovastatin (MEVACOR) 20 MG tablet       Salicylates     ASPIRIN LOW DOSE 81 MG EC tablet        ASPIRIN LOW DOSE 81 MG EC tablet             Last three blood pressure readings:  BP Readings from Last 3 Encounters:   03/15/19 123/82   01/09/19 132/88   01/03/19 114/76            Tobacco History:     History   Smoking Status     Never Smoker   Smokeless Tobacco     Never Used     Comment: no smokers in household           Composite cancer screening  Chart review shows that this patient is due/due soon for the following None  Summary:    Patient is due/failing the following:   Diabetic follow up, Microalbumin, CMP, BMP, A1C and LDL    Action needed:   Patient needs office visit for Diabetic follow up. and Patient needs fasting lab only appointment    Type of outreach:    Sent [employer] message.    Questions for provider review:    None                                                                                                                                    Mahnaz Kulkarni CMA       Chart routed to Care Team .

## 2020-02-10 ENCOUNTER — HEALTH MAINTENANCE LETTER (OUTPATIENT)
Age: 70
End: 2020-02-10

## 2020-11-16 ENCOUNTER — HEALTH MAINTENANCE LETTER (OUTPATIENT)
Age: 70
End: 2020-11-16

## 2021-02-07 ENCOUNTER — HEALTH MAINTENANCE LETTER (OUTPATIENT)
Age: 71
End: 2021-02-07

## 2021-04-04 ENCOUNTER — HEALTH MAINTENANCE LETTER (OUTPATIENT)
Age: 71
End: 2021-04-04

## 2021-05-29 ENCOUNTER — HEALTH MAINTENANCE LETTER (OUTPATIENT)
Age: 71
End: 2021-05-29

## 2021-08-04 NOTE — ASSESSMENT & PLAN NOTE
Based on exam and patient's symptoms and presentation is consistent with having a possible femoral hernia No signs of obstruction. Discussed conservative management including limiting any bearing down, lifting weights. Also advised weight loss. Discussed home care  Reportable signs and symptoms discussed  RTC if symptoms persist or fail to improve     PHYSICAL EXAMINATION:  Vital Signs Last 24 Hrs  T(C): 36.5 (04 Aug 2021 07:07), Max: 38.4 (04 Aug 2021 02:45)  T(F): 97.7 (04 Aug 2021 07:07), Max: 101.2 (04 Aug 2021 02:45)  HR: 85 (04 Aug 2021 08:22) (80 - 102)  BP: 175/66 (04 Aug 2021 08:22) (120/91 - 190/68)  BP(mean): --  RR: 20 (04 Aug 2021 08:22) (19 - 24)  SpO2: 100% (04 Aug 2021 08:22) (95% - 100%)  CAPILLARY BLOOD GLUCOSE          GENERAL: NAD, well-groomed, well-developed  HEAD:  atraumatic, normocephalic  EYES: sclera anicteric  ENMT: mucous membranes moist  NECK: supple, No JVD  CHEST/LUNG: clear to auscultation bilaterally; no rales, rhonchi, or wheezing b/l  HEART: normal S1, S2  ABDOMEN: BS+, soft, ND, NT   EXTREMITIES:  pulses palpable; no clubbing, cyanosis, or edema b/l LEs  NEURO: awake, alert, interactive; moves all extremities  SKIN: no rashes or lesions

## 2021-09-18 ENCOUNTER — HEALTH MAINTENANCE LETTER (OUTPATIENT)
Age: 71
End: 2021-09-18

## 2022-01-08 ENCOUNTER — HEALTH MAINTENANCE LETTER (OUTPATIENT)
Age: 72
End: 2022-01-08

## 2022-04-30 ENCOUNTER — HEALTH MAINTENANCE LETTER (OUTPATIENT)
Age: 72
End: 2022-04-30

## 2022-07-13 NOTE — MR AVS SNAPSHOT
After Visit Summary   7/10/2017    Vero Scott    MRN: [id number]           Patient Information     Date Of Birth          1950        Visit Information        Provider Department      7/10/2017 3:05 PM Marie Warner MD; [employer] [facility]        Today's Diagnoses     Chronic bilateral back pain, unspecified back location    -  1       Follow-ups after your visit        Future tests that were ordered for you today     Open Future Orders        Priority Expected Expires Ordered    XR Lumbar Spine 2/3 Views Routine 7/10/2017 7/10/2018 7/10/2017    XR Sacrum and Coccyx 2 Views Routine 7/10/2017 7/10/2018 7/10/2017            Who to contact     If you have questions or need follow up information about today's clinic visit or your schedule please [facility] at [phone].  Normal or non-critical lab and imaging results will be communicated to you by MyChart, letter or phone within 4 business days after the clinic has received the results. If you do not hear from us within 7 days, please contact the clinic through [employer] or phone. If you have a critical or abnormal lab result, we will notify you by phone as soon as possible.  Submit refill requests through [employer] or call your pharmacy and they will forward the refill request to us. Please allow 3 business days for your refill to be completed.          Additional Information About Your Visit        MyChart Information     [employer] gives you secure access to your electronic health record. If you see a primary care provider, you can also send messages to your care team and make appointments. If you have questions, please call your primary care clinic.  If you do not have a primary care provider, please call [phone] and they will assist you.        Care EveryWhere ID     This is your Care EveryWhere ID. This could be used by other organizations to access your [facility]  "records  [id number]        Your Vitals Were     Pulse Temperature Respirations Height Pulse Oximetry BMI (Body Mass Index)    92 98.2  F (36.8  C) (Oral) 16 5' 1.9\" (1.572 m) 96% 33.4 kg/m2       Blood Pressure from Last 3 Encounters:   07/10/17 116/81   04/10/17 122/78   09/13/16 120/80    Weight from Last 3 Encounters:   07/10/17 182 lb (82.6 kg)   04/10/17 185 lb (83.9 kg)   09/13/16 185 lb (83.9 kg)               Primary Care Provider Office Phone # Fax #    Marie Warner [phone]       [address] [address]        Equal Access to Services     SHANNAN ALEJANDRO : Maria M hartmann Sodemarcus, waaxda luqadaha, qaybta kaalmada hakan, hernandez wilcox . So [facility] [phone].    ATENCIÓN: Si habla español, tiene a berman disposición servicios gratuitos de asistencia lingüística. L[place] al [phone].    We comply with applicable federal civil rights laws and [place] laws. We do not discriminate on the basis of race, color, national origin, age, disability sex, sexual orientation or gender identity.            Thank you!     Thank you for choosing [facility]  for your care. Our goal is always to provide you with excellent care. Hearing back from our patients is one way we can continue to improve our services. Please take a few minutes to complete the written survey that you may receive in the mail after your visit with us. Thank you!             Your Updated Medication List - Protect others around you: Learn how to safely use, store and throw away your medicines at www.disposemymeds.org.          This list is accurate as of: 7/10/17  4:45 PM.  Always use your most recent med list.                   Brand Name Dispense Instructions for use Diagnosis    * ASPIRIN LOW DOSE 81 MG EC tablet   Generic drug:  aspirin     120 tablet    TAKE ONE TABLET BY MOUTH EVERY DAY    Hyperlipidemia LDL goal <130       * " ASPIRIN LOW DOSE 81 MG EC tablet   Generic drug:  aspirin     120 tablet    TAKE ONE TABLET BY MOUTH EVERY DAY    Hyperlipidemia LDL goal <130       blood glucose lancets standard    no brand specified    1 Box    Use to test blood sugar one  times daily or as directed.    Type 2 diabetes mellitus without complication (H)       blood glucose lancing device     1 each    Use to test blood sugars 1 times daily or as directed.    Impaired fasting glucose       blood glucose monitoring lancets     1 Box    TEST DAILY    Osteoporosis, Impaired fasting glucose       blood glucose monitoring meter device kit    no brand specified    1 kit    Use to test blood sugar one  times daily or as directed.    Type 2 diabetes mellitus without complication (H)       * blood glucose monitoring test strip    no brand specified    90 each    Use to test blood sugars one  times daily or as directed    Type 2 diabetes mellitus without complication (H)       * ANGELA CONTOUR test strip   Generic drug:  blood glucose monitoring     50 each    TEST DAILY AS DIRECTED    Impaired fasting glucose       * ANGELA CONTOUR test strip   Generic drug:  blood glucose monitoring     50 each    TEST DAILY AS DIRECTED    Impaired fasting glucose       Blood Pressure Kit     1 kit    Check blood pressures weekly    Benign essential hypertension       calcitonin (salmon) 200 UNIT/ACT nasal spray    MIACALCIN    3.7 mL    SPRAY ONE SPRAY INTO ONE NOSTRIL DAILY (ALTERNATE NOSTRIL DAILY )    Osteoporosis       levothyroxine 75 MCG tablet    SYNTHROID/LEVOTHROID    90 tablet    Take 1 tablet (75 mcg) by mouth every morning    Hypothyroidism, unspecified type       lovastatin 20 MG tablet    MEVACOR    90 tablet    Take 1 tablet (20 mg) by mouth At Bedtime    Hyperlipidemia LDL goal <130       metFORMIN 500 MG tablet    GLUCOPHAGE    180 tablet    Take 1 tablet (500 mg) by mouth 2 times daily (with meals)    Type 2 diabetes mellitus without complication, without  long-term current use of insulin (H)       omeprazole 20 MG CR capsule    priLOSEC    60 capsule    Take 1 tablet daily for 2 weeks and if still having heartburn symptoms can increase to 1 tablet twice daily.    Gastroesophageal reflux disease, esophagitis presence not specified       order for DME      Equipment being ordered: super feet size c    Plantar fasciitis       ranitidine 150 MG tablet    ZANTAC    180 tablet    TAKE ONE TABLET BY MOUTH TWICE A DAY    Gastroesophageal reflux disease, esophagitis presence not specified       VITAMIN D3 PO      Take 600 Units by mouth daily        * Notice:  This list has 5 medication(s) that are the same as other medications prescribed for you. Read the directions carefully, and ask your doctor or other care provider to review them with you.       No

## 2022-08-20 ENCOUNTER — HEALTH MAINTENANCE LETTER (OUTPATIENT)
Age: 72
End: 2022-08-20

## 2022-11-20 ENCOUNTER — HEALTH MAINTENANCE LETTER (OUTPATIENT)
Age: 72
End: 2022-11-20

## 2023-04-15 ENCOUNTER — HEALTH MAINTENANCE LETTER (OUTPATIENT)
Age: 73
End: 2023-04-15

## 2023-06-01 ENCOUNTER — HEALTH MAINTENANCE LETTER (OUTPATIENT)
Age: 73
End: 2023-06-01

## 2023-11-19 ENCOUNTER — HEALTH MAINTENANCE LETTER (OUTPATIENT)
Age: 73
End: 2023-11-19